# Patient Record
Sex: FEMALE | Race: WHITE | Employment: OTHER | URBAN - METROPOLITAN AREA
[De-identification: names, ages, dates, MRNs, and addresses within clinical notes are randomized per-mention and may not be internally consistent; named-entity substitution may affect disease eponyms.]

---

## 2017-01-12 ENCOUNTER — TELEPHONE (OUTPATIENT)
Dept: VASCULAR SURGERY | Age: 82
End: 2017-01-12

## 2017-01-17 ENCOUNTER — HOSPITAL ENCOUNTER (OUTPATIENT)
Dept: OTHER | Age: 82
Discharge: HOME | End: 2017-01-17
Attending: SURGERY | Admitting: SURGERY

## 2017-01-17 LAB
ANION GAP SERPL CALCULATED.3IONS-SCNC: 11 MMOL/L (ref 3–16)
APTT: 34.3 SEC (ref 25.2–36.4)
BUN BLDV-MCNC: 15 MG/DL (ref 7–20)
CALCIUM SERPL-MCNC: 9.2 MG/DL (ref 8.3–10.6)
CHLORIDE BLD-SCNC: 101 MMOL/L (ref 99–110)
CO2: 29 MMOL/L (ref 21–32)
CREAT SERPL-MCNC: 1.2 MG/DL (ref 0.6–1.2)
GFR AFRICAN AMERICAN: 52
GFR NON-AFRICAN AMERICAN: 43
GLUCOSE BLD-MCNC: 146 MG/DL (ref 70–99)
HCT VFR BLD CALC: 40.9 % (ref 36–48)
HEMOGLOBIN: 13.6 G/DL (ref 12–16)
INR BLD: 0.95 (ref 0.85–1.16)
MCH RBC QN AUTO: 30.5 PG (ref 26–34)
MCHC RBC AUTO-ENTMCNC: 33.3 G/DL (ref 31–36)
MCV RBC AUTO: 91.7 FL (ref 80–100)
PDW BLD-RTO: 12.8 % (ref 12.4–15.4)
PLATELET # BLD: 315 K/UL (ref 135–450)
PMV BLD AUTO: 7.8 FL (ref 5–10.5)
POTASSIUM SERPL-SCNC: 5.1 MMOL/L (ref 3.5–5.1)
PROTHROMBIN TIME: 10.8 SEC (ref 9.8–13)
RBC # BLD: 4.46 M/UL (ref 4–5.2)
SODIUM BLD-SCNC: 141 MMOL/L (ref 136–145)
WBC # BLD: 8.8 K/UL (ref 4–11)

## 2017-01-31 ENCOUNTER — HOSPITAL ENCOUNTER (OUTPATIENT)
Dept: OTHER | Age: 82
Discharge: HOME | End: 2017-01-31
Attending: INTERNAL MEDICINE | Admitting: INTERNAL MEDICINE

## 2017-01-31 DIAGNOSIS — R06.09 DYSPNEA ON EXERTION: ICD-10-CM

## 2017-02-21 ENCOUNTER — HOSPITAL ENCOUNTER (OUTPATIENT)
Dept: OTHER | Age: 82
Discharge: HOME | End: 2017-02-21
Attending: INTERNAL MEDICINE | Admitting: INTERNAL MEDICINE

## 2017-02-21 DIAGNOSIS — J18.9 PNEUMONIA OF RIGHT MIDDLE LOBE DUE TO INFECTIOUS ORGANISM: ICD-10-CM

## 2017-02-24 ENCOUNTER — OFFICE VISIT (OUTPATIENT)
Dept: CARDIOTHORACIC SURGERY | Age: 82
End: 2017-02-24

## 2017-02-24 VITALS — DIASTOLIC BLOOD PRESSURE: 70 MMHG | WEIGHT: 137 LBS | BODY MASS INDEX: 25.06 KG/M2 | SYSTOLIC BLOOD PRESSURE: 130 MMHG

## 2017-02-24 DIAGNOSIS — I70.213 ATHEROSCLEROSIS OF NATIVE ARTERY OF BOTH LOWER EXTREMITIES WITH INTERMITTENT CLAUDICATION (HCC): Primary | ICD-10-CM

## 2017-02-24 PROCEDURE — 99212 OFFICE O/P EST SF 10 MIN: CPT | Performed by: SURGERY

## 2017-02-24 PROCEDURE — 1090F PRES/ABSN URINE INCON ASSESS: CPT | Performed by: SURGERY

## 2017-02-24 PROCEDURE — 4040F PNEUMOC VAC/ADMIN/RCVD: CPT | Performed by: SURGERY

## 2017-02-24 PROCEDURE — G8400 PT W/DXA NO RESULTS DOC: HCPCS | Performed by: SURGERY

## 2017-02-24 PROCEDURE — G8598 ASA/ANTIPLAT THER USED: HCPCS | Performed by: SURGERY

## 2017-02-24 PROCEDURE — G8484 FLU IMMUNIZE NO ADMIN: HCPCS | Performed by: SURGERY

## 2017-02-24 PROCEDURE — G8427 DOCREV CUR MEDS BY ELIG CLIN: HCPCS | Performed by: SURGERY

## 2017-02-24 PROCEDURE — 1036F TOBACCO NON-USER: CPT | Performed by: SURGERY

## 2017-02-24 PROCEDURE — G8420 CALC BMI NORM PARAMETERS: HCPCS | Performed by: SURGERY

## 2017-02-24 PROCEDURE — 1123F ACP DISCUSS/DSCN MKR DOCD: CPT | Performed by: SURGERY

## 2017-02-24 RX ORDER — ATORVASTATIN CALCIUM 20 MG/1
20 TABLET, FILM COATED ORAL DAILY
Qty: 30 TABLET | Refills: 3 | Status: SHIPPED | OUTPATIENT
Start: 2017-02-24 | End: 2017-05-16 | Stop reason: SDUPTHER

## 2017-03-23 PROBLEM — G11.9 ATAXIA DUE TO CEREBELLAR DEGENERATION (HCC): Status: ACTIVE | Noted: 2017-03-23

## 2017-03-23 PROBLEM — J45.901 ASTHMATIC BRONCHITIS WITH ACUTE EXACERBATION: Status: ACTIVE | Noted: 2017-03-23

## 2017-04-12 ENCOUNTER — HOSPITAL ENCOUNTER (OUTPATIENT)
Dept: OTHER | Age: 82
Discharge: HOME | End: 2017-04-12
Attending: INTERNAL MEDICINE | Admitting: INTERNAL MEDICINE

## 2017-04-12 DIAGNOSIS — R42 DIZZINESS: ICD-10-CM

## 2017-04-12 LAB
A/G RATIO: 1.2 (ref 1.1–2.2)
ALBUMIN SERPL-MCNC: 3.6 G/DL (ref 3.4–5)
ALP BLD-CCNC: 69 U/L (ref 40–129)
ALT SERPL-CCNC: 12 U/L (ref 10–40)
ANION GAP SERPL CALCULATED.3IONS-SCNC: 15 MMOL/L (ref 3–16)
AST SERPL-CCNC: 13 U/L (ref 15–37)
BILIRUB SERPL-MCNC: 0.3 MG/DL (ref 0–1)
BUN BLDV-MCNC: 11 MG/DL (ref 7–20)
CALCIUM SERPL-MCNC: 8.8 MG/DL (ref 8.3–10.6)
CHLORIDE BLD-SCNC: 101 MMOL/L (ref 99–110)
CO2: 26 MMOL/L (ref 21–32)
CREAT SERPL-MCNC: 1 MG/DL (ref 0.6–1.2)
GFR AFRICAN AMERICAN: >60
GFR NON-AFRICAN AMERICAN: 53
GLOBULIN: 3.1 G/DL
GLUCOSE BLD-MCNC: 160 MG/DL (ref 70–99)
HCT VFR BLD CALC: 40.2 % (ref 36–48)
HEMOGLOBIN: 13.3 G/DL (ref 12–16)
MCH RBC QN AUTO: 30.2 PG (ref 26–34)
MCHC RBC AUTO-ENTMCNC: 33 G/DL (ref 31–36)
MCV RBC AUTO: 91.3 FL (ref 80–100)
PDW BLD-RTO: 14.1 % (ref 12.4–15.4)
PLATELET # BLD: 210 K/UL (ref 135–450)
PMV BLD AUTO: 7.9 FL (ref 5–10.5)
POTASSIUM SERPL-SCNC: 5.2 MMOL/L (ref 3.5–5.1)
RBC # BLD: 4.4 M/UL (ref 4–5.2)
SODIUM BLD-SCNC: 142 MMOL/L (ref 136–145)
TOTAL PROTEIN: 6.7 G/DL (ref 6.4–8.2)
WBC # BLD: 8.9 K/UL (ref 4–11)

## 2017-05-26 ENCOUNTER — OFFICE VISIT (OUTPATIENT)
Dept: VASCULAR SURGERY | Age: 82
End: 2017-05-26

## 2017-05-26 VITALS
DIASTOLIC BLOOD PRESSURE: 68 MMHG | BODY MASS INDEX: 25.21 KG/M2 | SYSTOLIC BLOOD PRESSURE: 130 MMHG | HEIGHT: 62 IN | WEIGHT: 137 LBS

## 2017-05-26 DIAGNOSIS — I70.223 ATHEROSCLEROSIS OF NATIVE ARTERIES OF EXTREMITIES WITH REST PAIN, BILATERAL LEGS (HCC): Primary | ICD-10-CM

## 2017-05-26 PROCEDURE — G8427 DOCREV CUR MEDS BY ELIG CLIN: HCPCS | Performed by: SURGERY

## 2017-05-26 PROCEDURE — G8598 ASA/ANTIPLAT THER USED: HCPCS | Performed by: SURGERY

## 2017-05-26 PROCEDURE — 4040F PNEUMOC VAC/ADMIN/RCVD: CPT | Performed by: SURGERY

## 2017-05-26 PROCEDURE — 1123F ACP DISCUSS/DSCN MKR DOCD: CPT | Performed by: SURGERY

## 2017-05-26 PROCEDURE — 99212 OFFICE O/P EST SF 10 MIN: CPT | Performed by: SURGERY

## 2017-05-26 PROCEDURE — G8420 CALC BMI NORM PARAMETERS: HCPCS | Performed by: SURGERY

## 2017-05-26 PROCEDURE — 1090F PRES/ABSN URINE INCON ASSESS: CPT | Performed by: SURGERY

## 2017-05-26 PROCEDURE — G8400 PT W/DXA NO RESULTS DOC: HCPCS | Performed by: SURGERY

## 2017-05-26 PROCEDURE — 1036F TOBACCO NON-USER: CPT | Performed by: SURGERY

## 2017-06-02 ENCOUNTER — CARE COORDINATION (OUTPATIENT)
Dept: CASE MANAGEMENT | Age: 82
End: 2017-06-02

## 2017-07-27 ENCOUNTER — OFFICE VISIT (OUTPATIENT)
Dept: ORTHOPEDIC SURGERY | Age: 82
End: 2017-07-27

## 2017-07-27 VITALS
TEMPERATURE: 97.4 F | DIASTOLIC BLOOD PRESSURE: 75 MMHG | HEIGHT: 62 IN | WEIGHT: 131 LBS | BODY MASS INDEX: 24.11 KG/M2 | HEART RATE: 70 BPM | SYSTOLIC BLOOD PRESSURE: 143 MMHG

## 2017-07-27 DIAGNOSIS — Z96.651 HISTORY OF TOTAL RIGHT KNEE REPLACEMENT: Primary | ICD-10-CM

## 2017-07-27 PROCEDURE — 1036F TOBACCO NON-USER: CPT | Performed by: PHYSICIAN ASSISTANT

## 2017-07-27 PROCEDURE — G8427 DOCREV CUR MEDS BY ELIG CLIN: HCPCS | Performed by: PHYSICIAN ASSISTANT

## 2017-07-27 PROCEDURE — 1123F ACP DISCUSS/DSCN MKR DOCD: CPT | Performed by: PHYSICIAN ASSISTANT

## 2017-07-27 PROCEDURE — 1090F PRES/ABSN URINE INCON ASSESS: CPT | Performed by: PHYSICIAN ASSISTANT

## 2017-07-27 PROCEDURE — 99212 OFFICE O/P EST SF 10 MIN: CPT | Performed by: PHYSICIAN ASSISTANT

## 2017-07-27 PROCEDURE — G8400 PT W/DXA NO RESULTS DOC: HCPCS | Performed by: PHYSICIAN ASSISTANT

## 2017-07-27 PROCEDURE — 4040F PNEUMOC VAC/ADMIN/RCVD: CPT | Performed by: PHYSICIAN ASSISTANT

## 2017-07-27 PROCEDURE — G8420 CALC BMI NORM PARAMETERS: HCPCS | Performed by: PHYSICIAN ASSISTANT

## 2017-07-27 PROCEDURE — G8598 ASA/ANTIPLAT THER USED: HCPCS | Performed by: PHYSICIAN ASSISTANT

## 2017-08-22 ENCOUNTER — HOSPITAL ENCOUNTER (OUTPATIENT)
Dept: VASCULAR LAB | Age: 82
Discharge: HOME | End: 2017-08-22
Attending: SURGERY | Admitting: SURGERY

## 2017-08-22 ENCOUNTER — OFFICE VISIT (OUTPATIENT)
Dept: VASCULAR SURGERY | Age: 82
End: 2017-08-22

## 2017-08-22 VITALS
WEIGHT: 126.6 LBS | DIASTOLIC BLOOD PRESSURE: 70 MMHG | HEIGHT: 62 IN | BODY MASS INDEX: 23.3 KG/M2 | SYSTOLIC BLOOD PRESSURE: 132 MMHG

## 2017-08-22 DIAGNOSIS — I70.223 ATHEROSCLEROSIS OF NATIVE ARTERIES OF EXTREMITIES WITH REST PAIN, BILATERAL LEGS (HCC): ICD-10-CM

## 2017-08-22 DIAGNOSIS — I70.223 ATHEROSCLEROSIS OF NATIVE ARTERY OF BOTH LOWER EXTREMITIES WITH REST PAIN (HCC): ICD-10-CM

## 2017-08-22 DIAGNOSIS — I70.223 ATHEROSCLEROSIS OF NATIVE ARTERIES OF EXTREMITIES WITH REST PAIN, BILATERAL LEGS (HCC): Primary | ICD-10-CM

## 2017-08-22 PROCEDURE — G8428 CUR MEDS NOT DOCUMENT: HCPCS | Performed by: SURGERY

## 2017-08-22 PROCEDURE — 4040F PNEUMOC VAC/ADMIN/RCVD: CPT | Performed by: SURGERY

## 2017-08-22 PROCEDURE — 1090F PRES/ABSN URINE INCON ASSESS: CPT | Performed by: SURGERY

## 2017-08-22 PROCEDURE — G8420 CALC BMI NORM PARAMETERS: HCPCS | Performed by: SURGERY

## 2017-08-22 PROCEDURE — 1036F TOBACCO NON-USER: CPT | Performed by: SURGERY

## 2017-08-22 PROCEDURE — G8400 PT W/DXA NO RESULTS DOC: HCPCS | Performed by: SURGERY

## 2017-08-22 PROCEDURE — G8598 ASA/ANTIPLAT THER USED: HCPCS | Performed by: SURGERY

## 2017-08-22 PROCEDURE — 99213 OFFICE O/P EST LOW 20 MIN: CPT | Performed by: SURGERY

## 2017-08-22 PROCEDURE — 1123F ACP DISCUSS/DSCN MKR DOCD: CPT | Performed by: SURGERY

## 2017-09-10 RX ORDER — DIPHENOXYLATE HYDROCHLORIDE AND ATROPINE SULFATE 2.5; .025 MG/1; MG/1
1 TABLET ORAL 4 TIMES DAILY PRN
Qty: 120 TABLET | Refills: 1 | OUTPATIENT
Start: 2017-09-10 | End: 2017-09-11 | Stop reason: SDUPTHER

## 2017-10-10 ENCOUNTER — HOSPITAL ENCOUNTER (OUTPATIENT)
Dept: VASCULAR LAB | Age: 82
Discharge: HOME | End: 2017-10-10
Attending: SURGERY | Admitting: SURGERY

## 2017-10-10 ENCOUNTER — OFFICE VISIT (OUTPATIENT)
Dept: VASCULAR SURGERY | Age: 82
End: 2017-10-10

## 2017-10-10 VITALS
WEIGHT: 126 LBS | DIASTOLIC BLOOD PRESSURE: 62 MMHG | BODY MASS INDEX: 23.19 KG/M2 | HEIGHT: 62 IN | SYSTOLIC BLOOD PRESSURE: 118 MMHG

## 2017-10-10 DIAGNOSIS — I82.811: Primary | ICD-10-CM

## 2017-10-10 DIAGNOSIS — I82.811 EMBOLISM AND THROMBOSIS OF SUPERFICIAL VEIN OF RIGHT LOWER EXTREMITY: ICD-10-CM

## 2017-10-10 DIAGNOSIS — I82.811: ICD-10-CM

## 2017-10-10 PROCEDURE — G8598 ASA/ANTIPLAT THER USED: HCPCS | Performed by: SURGERY

## 2017-10-10 PROCEDURE — 1123F ACP DISCUSS/DSCN MKR DOCD: CPT | Performed by: SURGERY

## 2017-10-10 PROCEDURE — G8427 DOCREV CUR MEDS BY ELIG CLIN: HCPCS | Performed by: SURGERY

## 2017-10-10 PROCEDURE — 99212 OFFICE O/P EST SF 10 MIN: CPT | Performed by: SURGERY

## 2017-10-10 PROCEDURE — G8420 CALC BMI NORM PARAMETERS: HCPCS | Performed by: SURGERY

## 2017-10-10 PROCEDURE — G8400 PT W/DXA NO RESULTS DOC: HCPCS | Performed by: SURGERY

## 2017-10-10 PROCEDURE — 1036F TOBACCO NON-USER: CPT | Performed by: SURGERY

## 2017-10-10 PROCEDURE — 4040F PNEUMOC VAC/ADMIN/RCVD: CPT | Performed by: SURGERY

## 2017-10-10 PROCEDURE — G8484 FLU IMMUNIZE NO ADMIN: HCPCS | Performed by: SURGERY

## 2017-10-10 PROCEDURE — 1090F PRES/ABSN URINE INCON ASSESS: CPT | Performed by: SURGERY

## 2017-10-10 NOTE — PROGRESS NOTES
meals) 180 tablet 0    SITagliptin (JANUVIA) 100 MG tablet Take 1 tablet by mouth daily 90 tablet 0    Calcium Polycarbophil (FIBER) 625 MG TABS Take 1 tablet by mouth 2 times daily 60 tablet 5    apixaban (ELIQUIS) 5 MG TABS tablet Take 5 mg by mouth 2 times daily      atorvastatin (LIPITOR) 20 MG tablet Take 1 tablet by mouth daily 30 tablet 3    guaiFENesin (MUCINEX) 600 MG extended release tablet Take 1 tablet by mouth 2 times daily 40 tablet 1    albuterol sulfate HFA (PROAIR HFA) 108 (90 BASE) MCG/ACT inhaler Inhale 2 puffs into the lungs every 4 hours as needed for Wheezing or Shortness of Breath 1 Inhaler 3    fluticasone (FLONASE) 50 MCG/ACT nasal spray 1 spray by Nasal route daily 3 Bottle 1    Calcium Carbonate-Vitamin D (CALTRATE 600+D PO) Take 1 tablet by mouth 2 times daily.  Multiple Vitamin (MULTIVITAMIN PO) Take  by mouth daily. No current facility-administered medications for this visit. Allergies:  Cephalexin and Morphine     Review of Systems:   · Constitutional: there has been no unanticipated weight loss. There's been no change in energy level, sleep pattern, or activity level. · Eyes: No visual changes or diplopia. No scleral icterus. · ENT: No Headaches, hearing loss or vertigo. No mouth sores or sore throat. · Cardiovascular: Reviewed in HPI  · Respiratory: No cough or wheezing, no sputum production. No hematemesis. · Gastrointestinal: No abdominal pain, appetite loss, blood in stools. No change in bowel or bladder habits. · Genitourinary: No dysuria, trouble voiding, or hematuria. · Musculoskeletal:  No gait disturbance, weakness or joint complaints. · Integumentary: No rash or pruritis. · Neurological: No headache, diplopia, change in muscle strength, numbness or tingling. No change in gait, balance, coordination, mood, affect, memory, mentation, behavior. · Psychiatric: No anxiety, no depression.   · Endocrine: No malaise, fatigue or temperature

## 2017-10-11 ENCOUNTER — TELEPHONE (OUTPATIENT)
Dept: VASCULAR SURGERY | Age: 82
End: 2017-10-11

## 2017-11-22 PROBLEM — I48.0 PAROXYSMAL ATRIAL FIBRILLATION (HCC): Status: ACTIVE | Noted: 2017-11-22

## 2018-01-19 PROBLEM — J45.901 ASTHMATIC BRONCHITIS WITH ACUTE EXACERBATION: Status: RESOLVED | Noted: 2017-03-23 | Resolved: 2018-01-19

## 2018-01-19 PROBLEM — E11.21 CONTROLLED TYPE 2 DIABETES MELLITUS WITH DIABETIC NEPHROPATHY, WITHOUT LONG-TERM CURRENT USE OF INSULIN (HCC): Status: ACTIVE | Noted: 2017-05-16

## 2018-02-16 PROBLEM — G11.9 ATAXIA DUE TO CEREBELLAR DEGENERATION (HCC): Status: RESOLVED | Noted: 2017-03-23 | Resolved: 2018-02-16

## 2018-03-09 ENCOUNTER — OFFICE VISIT (OUTPATIENT)
Dept: VASCULAR SURGERY | Age: 83
End: 2018-03-09

## 2018-03-09 VITALS
SYSTOLIC BLOOD PRESSURE: 122 MMHG | BODY MASS INDEX: 21.71 KG/M2 | DIASTOLIC BLOOD PRESSURE: 66 MMHG | HEIGHT: 62 IN | WEIGHT: 118 LBS

## 2018-03-09 DIAGNOSIS — I70.213 ATHEROSCLEROSIS OF NATIVE ARTERIES OF EXTREMITIES WITH INTERMITTENT CLAUDICATION, BILATERAL LEGS (HCC): Primary | ICD-10-CM

## 2018-03-09 PROCEDURE — 99212 OFFICE O/P EST SF 10 MIN: CPT | Performed by: SURGERY

## 2018-03-09 PROCEDURE — 1090F PRES/ABSN URINE INCON ASSESS: CPT | Performed by: SURGERY

## 2018-03-09 PROCEDURE — 4040F PNEUMOC VAC/ADMIN/RCVD: CPT | Performed by: SURGERY

## 2018-03-09 PROCEDURE — G8482 FLU IMMUNIZE ORDER/ADMIN: HCPCS | Performed by: SURGERY

## 2018-03-09 PROCEDURE — G8400 PT W/DXA NO RESULTS DOC: HCPCS | Performed by: SURGERY

## 2018-03-09 PROCEDURE — 1036F TOBACCO NON-USER: CPT | Performed by: SURGERY

## 2018-03-09 PROCEDURE — G8420 CALC BMI NORM PARAMETERS: HCPCS | Performed by: SURGERY

## 2018-03-09 PROCEDURE — G8598 ASA/ANTIPLAT THER USED: HCPCS | Performed by: SURGERY

## 2018-03-09 PROCEDURE — G8427 DOCREV CUR MEDS BY ELIG CLIN: HCPCS | Performed by: SURGERY

## 2018-03-09 PROCEDURE — 1123F ACP DISCUSS/DSCN MKR DOCD: CPT | Performed by: SURGERY

## 2018-03-09 NOTE — PROGRESS NOTES
No tremor. · Hematologic/Lymphatic: No abnormal bruising or bleeding, blood clots or swollen lymph nodes. · Allergic/Immunologic: No nasal congestion or hives. Physical Examination:    Vitals:    03/09/18 1059   BP: 122/66          General appearance: alert, appears stated age, cooperative and no distress  Head: Normocephalic, without obvious abnormality, atraumatic  Eyes: conjunctivae/corneas clear. PERRL, EOM's intact. Fundi benign  Neck: no adenopathy, no carotid bruit, no JVD, supple, symmetrical, trachea midline and thyroid: not enlarged, symmetric, no tenderness/mass/nodules  Lungs: clear to auscultation bilaterally  Heart: regular rate and rhythm  Abdomen: soft, non-tender. Bowel sounds normal. No masses,  no organomegaly  Extremities: extremities normal, atraumatic, no cyanosis or edema    Pulses:   L brachial 2 R brachial 2   L radial 2 R radial 2   L femoral 2 R femoral 2   L popliteal 2 R popliteal 2   L posterior tibial 2 R posterior tibial 2   L dorsalis pedis + R dorsalis pedis +   Doppler Signals:  +    Neurologic: Grossly normal    Assessment:     Patient Active Problem List   Diagnosis    Hypothyroidism    Essential hypertension    Osteopenia    Cervical radiculopathy    Thoracic back pain    Gastroesophageal reflux disease without esophagitis    Acquired hypothyroidism    PVD (peripheral vascular disease) ([address]    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 Atherosclerosis of native arteries of extremities with intermittent claudication, bilateral legs [facility])  80-year-old female with known history of peripheral vascular disease and discomfort in her legs with rest and with activity.   Her previous lower extremity noninvasive studies suggested mild

## 2018-03-13 ENCOUNTER — HOSPITAL ENCOUNTER (OUTPATIENT)
Dept: VASCULAR LAB | Age: 83
Discharge: HOME | End: 2018-03-13
Attending: SURGERY | Admitting: SURGERY

## 2018-03-13 DIAGNOSIS — I70.213 ATHEROSCLEROSIS OF NATIVE ARTERIES OF EXTREMITIES WITH INTERMITTENT CLAUDICATION, BILATERAL LEGS (HCC): ICD-10-CM

## 2018-03-13 DIAGNOSIS — I70.213 ATHEROSCLEROSIS OF NATIVE ARTERY OF BOTH LOWER EXTREMITIES WITH INTERMITTENT CLAUDICATION (HCC): ICD-10-CM

## 2018-03-19 ENCOUNTER — TELEPHONE (OUTPATIENT)
Dept: VASCULAR SURGERY | Age: 83
End: 2018-03-19

## 2018-03-19 NOTE — TELEPHONE ENCOUNTER
Spoke with patient regarding results of lower extremity arterial duplex which show mild arterial insufficiency. Recommend moving forward with angiogram, discussed with Dr. Tari Holt as well. Patient is in agreement and our office will call to schedule. All questions answered.     Electronically signed by Mir Giles CNP on 3/19/2018 at 3:57 PM

## 2018-03-27 PROBLEM — E04.1 THYROID NODULE: Status: ACTIVE | Noted: 2018-03-27

## 2018-04-02 ENCOUNTER — HOSPITAL ENCOUNTER (OUTPATIENT)
Dept: OTHER | Age: 83
Discharge: HOME | End: 2018-04-02
Attending: SURGERY | Admitting: SURGERY

## 2018-04-02 ENCOUNTER — HOSPITAL ENCOUNTER (OUTPATIENT)
Dept: ULTRASOUND IMAGING | Age: 83
Discharge: HOME | End: 2018-04-02
Attending: INTERNAL MEDICINE | Admitting: INTERNAL MEDICINE

## 2018-04-02 DIAGNOSIS — E04.1 NONTOXIC SINGLE THYROID NODULE: ICD-10-CM

## 2018-04-02 DIAGNOSIS — E04.1 THYROID NODULE: ICD-10-CM

## 2018-04-02 LAB
ANION GAP SERPL CALCULATED.3IONS-SCNC: 9 MMOL/L (ref 3–16)
APTT: 33 SEC (ref 24.1–34.9)
BUN BLDV-MCNC: 28 MG/DL (ref 7–20)
CALCIUM SERPL-MCNC: 9.6 MG/DL (ref 8.3–10.6)
CHLORIDE BLD-SCNC: 101 MMOL/L (ref 99–110)
CO2: 28 MMOL/L (ref 21–32)
CREAT SERPL-MCNC: 1.8 MG/DL (ref 0.6–1.2)
GFR AFRICAN AMERICAN: 32
GFR NON-AFRICAN AMERICAN: 27
GLUCOSE BLD-MCNC: 92 MG/DL (ref 70–99)
HCT VFR BLD CALC: 37.6 % (ref 36–48)
HEMOGLOBIN: 12.7 G/DL (ref 12–16)
INR BLD: 1 (ref 0.85–1.15)
MCH RBC QN AUTO: 30.9 PG (ref 26–34)
MCHC RBC AUTO-ENTMCNC: 33.8 G/DL (ref 31–36)
MCV RBC AUTO: 91.6 FL (ref 80–100)
PDW BLD-RTO: 13.7 % (ref 12.4–15.4)
PLATELET # BLD: 280 K/UL (ref 135–450)
PMV BLD AUTO: 8 FL (ref 5–10.5)
POTASSIUM SERPL-SCNC: 4.7 MMOL/L (ref 3.5–5.1)
PROTHROMBIN TIME: 11.3 SEC (ref 9.6–13)
RBC # BLD: 4.11 M/UL (ref 4–5.2)
SODIUM BLD-SCNC: 138 MMOL/L (ref 136–145)
WBC # BLD: 7 K/UL (ref 4–11)

## 2018-04-09 ENCOUNTER — TELEPHONE (OUTPATIENT)
Dept: VASCULAR SURGERY | Age: 83
End: 2018-04-09

## 2018-04-13 PROBLEM — N17.9 ACUTE KIDNEY INJURY (HCC): Status: ACTIVE | Noted: 2018-04-13

## 2018-05-29 ENCOUNTER — HOSPITAL ENCOUNTER (OUTPATIENT)
Dept: NUCLEAR MEDICINE | Age: 83
Discharge: HOME | End: 2018-05-29
Admitting: INTERNAL MEDICINE

## 2018-05-29 DIAGNOSIS — E04.1 NONTOXIC SINGLE THYROID NODULE: ICD-10-CM

## 2018-05-29 DIAGNOSIS — E04.1 THYROID NODULE, COLD: ICD-10-CM

## 2018-05-30 ENCOUNTER — HOSPITAL ENCOUNTER (OUTPATIENT)
Dept: NUCLEAR MEDICINE | Age: 83
Discharge: HOME | End: 2018-05-31
Admitting: INTERNAL MEDICINE

## 2018-05-30 DIAGNOSIS — E04.1 NONTOXIC SINGLE THYROID NODULE: ICD-10-CM

## 2018-06-15 ENCOUNTER — OFFICE VISIT (OUTPATIENT)
Dept: VASCULAR SURGERY | Age: 83
End: 2018-06-15

## 2018-06-15 VITALS
HEIGHT: 62 IN | BODY MASS INDEX: 22.08 KG/M2 | WEIGHT: 120 LBS | SYSTOLIC BLOOD PRESSURE: 118 MMHG | DIASTOLIC BLOOD PRESSURE: 64 MMHG

## 2018-06-15 DIAGNOSIS — I70.213 ATHEROSCLEROSIS OF NATIVE ARTERIES OF EXTREMITIES WITH INTERMITTENT CLAUDICATION, BILATERAL LEGS (HCC): Primary | ICD-10-CM

## 2018-06-15 PROCEDURE — G8420 CALC BMI NORM PARAMETERS: HCPCS | Performed by: SURGERY

## 2018-06-15 PROCEDURE — 1036F TOBACCO NON-USER: CPT | Performed by: SURGERY

## 2018-06-15 PROCEDURE — 1123F ACP DISCUSS/DSCN MKR DOCD: CPT | Performed by: SURGERY

## 2018-06-15 PROCEDURE — 99212 OFFICE O/P EST SF 10 MIN: CPT | Performed by: SURGERY

## 2018-06-15 PROCEDURE — G8598 ASA/ANTIPLAT THER USED: HCPCS | Performed by: SURGERY

## 2018-06-15 PROCEDURE — 4040F PNEUMOC VAC/ADMIN/RCVD: CPT | Performed by: SURGERY

## 2018-06-15 PROCEDURE — G8400 PT W/DXA NO RESULTS DOC: HCPCS | Performed by: SURGERY

## 2018-06-15 PROCEDURE — G8427 DOCREV CUR MEDS BY ELIG CLIN: HCPCS | Performed by: SURGERY

## 2018-06-15 PROCEDURE — 1090F PRES/ABSN URINE INCON ASSESS: CPT | Performed by: SURGERY

## 2018-06-19 PROBLEM — M47.816 SPONDYLOSIS OF LUMBAR SPINE: Status: ACTIVE | Noted: 2018-06-19

## 2018-07-31 ENCOUNTER — OFFICE VISIT (OUTPATIENT)
Dept: ORTHOPEDIC SURGERY | Age: 83
End: 2018-07-31

## 2018-07-31 VITALS
DIASTOLIC BLOOD PRESSURE: 72 MMHG | SYSTOLIC BLOOD PRESSURE: 157 MMHG | WEIGHT: 121 LBS | TEMPERATURE: 97 F | HEART RATE: 62 BPM | HEIGHT: 62 IN | BODY MASS INDEX: 22.26 KG/M2

## 2018-07-31 DIAGNOSIS — Z96.651 HISTORY OF TOTAL RIGHT KNEE REPLACEMENT: Primary | ICD-10-CM

## 2018-07-31 PROCEDURE — 1036F TOBACCO NON-USER: CPT | Performed by: PHYSICIAN ASSISTANT

## 2018-07-31 PROCEDURE — 1123F ACP DISCUSS/DSCN MKR DOCD: CPT | Performed by: PHYSICIAN ASSISTANT

## 2018-07-31 PROCEDURE — G8598 ASA/ANTIPLAT THER USED: HCPCS | Performed by: PHYSICIAN ASSISTANT

## 2018-07-31 PROCEDURE — 1090F PRES/ABSN URINE INCON ASSESS: CPT | Performed by: PHYSICIAN ASSISTANT

## 2018-07-31 PROCEDURE — G8427 DOCREV CUR MEDS BY ELIG CLIN: HCPCS | Performed by: PHYSICIAN ASSISTANT

## 2018-07-31 PROCEDURE — 99212 OFFICE O/P EST SF 10 MIN: CPT | Performed by: PHYSICIAN ASSISTANT

## 2018-07-31 PROCEDURE — 1101F PT FALLS ASSESS-DOCD LE1/YR: CPT | Performed by: PHYSICIAN ASSISTANT

## 2018-07-31 PROCEDURE — 4040F PNEUMOC VAC/ADMIN/RCVD: CPT | Performed by: PHYSICIAN ASSISTANT

## 2018-07-31 PROCEDURE — G8400 PT W/DXA NO RESULTS DOC: HCPCS | Performed by: PHYSICIAN ASSISTANT

## 2018-07-31 PROCEDURE — G8420 CALC BMI NORM PARAMETERS: HCPCS | Performed by: PHYSICIAN ASSISTANT

## 2018-08-01 NOTE — PROGRESS NOTES
Subjective:      Patient ID: Marylee Sevin is a 80 y.o. female. Chief Complaint   Patient presents with    Follow-up     right TKA DOS 3/4/16        HPI: She is here for annual follow up on right knee arthroplasty. The date of procedure- 3/4/2016. No new complaints or issues. There is minimal to no discomfort with ambulation. There is minimal to no discomfort at rest.   Steps can be performed in reciprocal fashion. Review of Systems:   A full list of the ROS have been reviewed. These are recorded and signed in the chart. Past Medical History:   Diagnosis Date    [facility]     DDD (degenerative disc disease)     Hypertension     Macular degeneration     bilateral    PVD [facility]peripheral vascular disease) [facility]     Reflux     Thyroid disease        Family History   Problem Relation Age of Onset    Diabetes Mother     Heart Disease Father        Past Surgical History:   Procedure Laterality Date    CATARACT REMOVAL WITH IMPLANT Right     CHOLECYSTECTOMY      COLON SURGERY      small bowel polyps    EYE SURGERY      for macular deg    HYSTERECTOMY      JOINT REPLACEMENT Right 03-    knee    KNEE ARTHROSCOPY  8-20-15    right medial menisectomy loose body.  KNEE ARTHROSCOPY Right 8/20/2015    RIGHT KNEE ARTHROSCOPIC PARTIAL MENISECTOMY AND REMOVAL OF LOOSE BODY    PRE-MALIGNANT / BENIGN SKIN LESION EXCISION  3/4/11    inside right cheek    SINUS SURGERY      TONSILLECTOMY         Social History     Occupational History    Not on file.      Social History Main Topics    Smoking status: Former Smoker    Smokeless tobacco: Never Used      Comment: quit [id number]    Alcohol use No    Drug use: No    Sexual activity: Not on file       Current Outpatient Prescriptions   Medication Sig Dispense Refill    apixaban (ELIQUIS) 2.5 MG TABS tablet Take 1 tablet by mouth 2 times daily 180 tablet 1    pantoprazole (PROTONIX) 40 MG tablet Take 1 tablet by mouth daily 90 tablet 0    levothyroxine (SYNTHROID) 50 MCG tablet Take 1 tablet by mouth daily 90 tablet 0    clopidogrel (PLAVIX) 75 MG tablet Take 1 tablet by mouth daily 90 tablet 0    metoprolol tartrate (LOPRESSOR) 50 MG tablet Take 1 tablet by mouth daily 90 tablet 0    metFORMIN (GLUCOPHAGE) 1000 MG tablet Take 1 tablet by mouth 2 times daily (with meals) 180 tablet 0    tiZANidine (ZANAFLEX) 4 MG tablet Take 1 tablet by mouth nightly 90 tablet 0    apixaban (ELIQUIS) 2.5 MG TABS tablet Take by mouth 2 times daily      Calcium Polycarbophil (FIBER) 625 MG TABS Take 1 tablet by mouth 2 times daily 60 tablet 5    albuterol sulfate HFA (PROAIR HFA) 108 (90 BASE) MCG/ACT inhaler Inhale 2 puffs into the lungs every 4 hours as needed for Wheezing or Shortness of Breath 1 Inhaler 3    Calcium Carbonate-Vitamin D (CALTRATE 600+D PO) Take 1 tablet by mouth 2 times daily.  Multiple Vitamin (MULTIVITAMIN PO) Take  by mouth daily. No current facility-administered medications for this visit. Objective:     She is alert, oriented x 3, pleasant, well nourished, developed and in no acute distress. BP (!) 157/72   Pulse 62   Temp 97 °F (36.1 °C) (Temporal)   Ht 5' 2\" (1.575 m)   Wt 121 lb (54.9 kg)   BMI 22.13 kg/m²      KNEE EXAM:  Examination of the right knee shows: There is  no obvious deformity. There is not erythema. There is minimal to no soft tissue swelling. There is no significant joint effusion. AROM-  Extension 0                     Flexion  125  There is no pain associated with ROM testing. Medial joint line is not tender to palpation. Lateral joint line is not tender to palpation. There is not crepitus along the joint line with ROM testing. There is no significant instability with varus or valgus stress testing. Anterior Drawer test is negative for instability. Extensor Mechanism is  intact.      X Rays: performed in the office today:   AP,

## 2018-10-11 ENCOUNTER — OFFICE VISIT (OUTPATIENT)
Dept: ENT CLINIC | Age: 83
End: 2018-10-11
Payer: MEDICARE

## 2018-10-11 VITALS — OXYGEN SATURATION: 97 % | DIASTOLIC BLOOD PRESSURE: 56 MMHG | HEART RATE: 62 BPM | SYSTOLIC BLOOD PRESSURE: 124 MMHG

## 2018-10-11 DIAGNOSIS — K13.79 ORAL MASS: Primary | ICD-10-CM

## 2018-10-11 PROCEDURE — 1101F PT FALLS ASSESS-DOCD LE1/YR: CPT | Performed by: OTOLARYNGOLOGY

## 2018-10-11 PROCEDURE — 1123F ACP DISCUSS/DSCN MKR DOCD: CPT | Performed by: OTOLARYNGOLOGY

## 2018-10-11 PROCEDURE — G8400 PT W/DXA NO RESULTS DOC: HCPCS | Performed by: OTOLARYNGOLOGY

## 2018-10-11 PROCEDURE — 4040F PNEUMOC VAC/ADMIN/RCVD: CPT | Performed by: OTOLARYNGOLOGY

## 2018-10-11 PROCEDURE — G8427 DOCREV CUR MEDS BY ELIG CLIN: HCPCS | Performed by: OTOLARYNGOLOGY

## 2018-10-11 PROCEDURE — G8482 FLU IMMUNIZE ORDER/ADMIN: HCPCS | Performed by: OTOLARYNGOLOGY

## 2018-10-11 PROCEDURE — G8420 CALC BMI NORM PARAMETERS: HCPCS | Performed by: OTOLARYNGOLOGY

## 2018-10-11 PROCEDURE — 1090F PRES/ABSN URINE INCON ASSESS: CPT | Performed by: OTOLARYNGOLOGY

## 2018-10-11 PROCEDURE — G8598 ASA/ANTIPLAT THER USED: HCPCS | Performed by: OTOLARYNGOLOGY

## 2018-10-11 PROCEDURE — 1036F TOBACCO NON-USER: CPT | Performed by: OTOLARYNGOLOGY

## 2018-10-11 PROCEDURE — 99203 OFFICE O/P NEW LOW 30 MIN: CPT | Performed by: OTOLARYNGOLOGY

## 2018-10-11 ASSESSMENT — ENCOUNTER SYMPTOMS
TROUBLE SWALLOWING: 0
SINUS PAIN: 0
RESPIRATORY NEGATIVE: 1
SORE THROAT: 0
RHINORRHEA: 0
FACIAL SWELLING: 0
EYES NEGATIVE: 1
SINUS PRESSURE: 0
ALLERGIC/IMMUNOLOGIC NEGATIVE: 1

## 2018-10-11 NOTE — PROGRESS NOTES
Lymphadenopathy:     She has no cervical adenopathy. Neurological: She is alert and oriented to person, place, and time. Skin: Skin is warm and dry. Psychiatric: She has a normal mood and affect. Her behavior is normal. Judgment and thought content normal.       Assessment:      Right buccal submucosal mass      Plan:      Excision of the mass will be done under local anesthesia on an outpatient basis.         Peter rBay MD

## 2018-10-26 NOTE — PROGRESS NOTES
piercings on day of surgery. All body piercing jewelry must be removed. 11. If you have ___dentures, they will be removed before going to the OR; we will provide you a container. If you wear ___contact lenses or ___glasses, they will be removed; please bring a case for them. 12. Please see your family doctor/pediatrician for a history & physical and/or concerning medications. Bring any test results/reports from your physician's office. PCP__________________Phone___________H&P Appt. Date________             13 If you  have a Living Will and Durable Power of  for Healthcare, please bring in a copy. 15. Notify your Surgeon if you develop any illness between now and surgery  time, cough, cold, fever, sore throat, nausea, vomiting, etc.  Please notify your surgeon if you experience dizziness, shortness of breath or blurred vision between now & the time of your surgery             15. DO NOT shave your operative site 96 hours prior to surgery. For face & neck surgery, men may use an electric razor 48 hours prior to surgery. 16. Shower the night before surgery with ___Antibacterial soap ___Hibiclens             17. To provide excellent care visitors will be limited to one in the room at any given time. 18.  Please bring picture ID and insurance card. 19.  Visit our web site for additional information:  [email or web address]              20.During flu season no children under the age of 15 are permitted in the hospital for the safety of all patients. 21. If you take a long acting insulin in the evening only  take half of your usual  dose the night  before your procedure              22. If you use a c-pap please bring DOS if staying overnight,             23.For your convenience [facility] has a pharmacy on site to fill your prescriptions.              24. If you use oxygen and have a portable tank please bring it

## 2018-10-31 ENCOUNTER — HOSPITAL ENCOUNTER (OUTPATIENT)
Age: 83
Setting detail: OUTPATIENT SURGERY
Discharge: HOME | End: 2018-10-31
Attending: OTOLARYNGOLOGY | Admitting: OTOLARYNGOLOGY
Payer: MEDICARE

## 2018-10-31 VITALS
TEMPERATURE: 98.8 F | BODY MASS INDEX: 21.7 KG/M2 | SYSTOLIC BLOOD PRESSURE: 152 MMHG | HEART RATE: 69 BPM | DIASTOLIC BLOOD PRESSURE: 59 MMHG | OXYGEN SATURATION: 100 % | HEIGHT: 62 IN | WEIGHT: 117.9 LBS | RESPIRATION RATE: 14 BRPM

## 2018-10-31 DIAGNOSIS — K13.79 ORAL MASS: Primary | ICD-10-CM

## 2018-10-31 LAB
GLUCOSE BLD-MCNC: 100 MG/DL (ref 70–99)
PERFORMED ON: ABNORMAL

## 2018-10-31 PROCEDURE — 88305 TISSUE EXAM BY PATHOLOGIST: CPT

## 2018-10-31 PROCEDURE — 7100000010 HC PHASE II RECOVERY - FIRST 15 MIN: Performed by: OTOLARYNGOLOGY

## 2018-10-31 PROCEDURE — 2709999900 HC NON-CHARGEABLE SUPPLY: Performed by: OTOLARYNGOLOGY

## 2018-10-31 PROCEDURE — 3600000015 HC SURGERY LEVEL 5 ADDTL 15MIN: Performed by: OTOLARYNGOLOGY

## 2018-10-31 PROCEDURE — 7100000011 HC PHASE II RECOVERY - ADDTL 15 MIN: Performed by: OTOLARYNGOLOGY

## 2018-10-31 PROCEDURE — 2500000003 HC RX 250 WO HCPCS: Performed by: OTOLARYNGOLOGY

## 2018-10-31 PROCEDURE — 3600000005 HC SURGERY LEVEL 5 BASE: Performed by: OTOLARYNGOLOGY

## 2018-10-31 PROCEDURE — 40812 EXCISE/REPAIR MOUTH LESION: CPT | Performed by: OTOLARYNGOLOGY

## 2018-10-31 RX ORDER — ACETAMINOPHEN AND CODEINE PHOSPHATE 300; 30 MG/1; MG/1
1 TABLET ORAL EVERY 4 HOURS PRN
Qty: 20 TABLET | Refills: 0 | Status: SHIPPED | OUTPATIENT
Start: 2018-10-31 | End: 2018-11-07

## 2018-10-31 RX ORDER — SODIUM CHLORIDE 0.9 % (FLUSH) 0.9 %
10 SYRINGE (ML) INJECTION PRN
Status: CANCELLED | OUTPATIENT
Start: 2018-10-31

## 2018-10-31 RX ORDER — AZITHROMYCIN 250 MG/1
TABLET, FILM COATED ORAL
Qty: 1 PACKET | Refills: 0 | Status: SHIPPED | OUTPATIENT
Start: 2018-10-31 | End: 2018-11-04

## 2018-10-31 RX ORDER — ACETAMINOPHEN 325 MG/1
650 TABLET ORAL EVERY 4 HOURS PRN
Status: CANCELLED | OUTPATIENT
Start: 2018-10-31

## 2018-10-31 RX ORDER — SODIUM CHLORIDE 0.9 % (FLUSH) 0.9 %
10 SYRINGE (ML) INJECTION EVERY 12 HOURS SCHEDULED
Status: CANCELLED | OUTPATIENT
Start: 2018-10-31

## 2018-10-31 RX ORDER — LIDOCAINE HYDROCHLORIDE AND EPINEPHRINE 10; 10 MG/ML; UG/ML
INJECTION, SOLUTION INFILTRATION; PERINEURAL
Status: COMPLETED | OUTPATIENT
Start: 2018-10-31 | End: 2018-10-31

## 2018-10-31 RX ORDER — ONDANSETRON 2 MG/ML
4 INJECTION INTRAMUSCULAR; INTRAVENOUS EVERY 6 HOURS PRN
Status: CANCELLED | OUTPATIENT
Start: 2018-10-31

## 2018-10-31 ASSESSMENT — PAIN SCALES - GENERAL: PAINLEVEL_OUTOF10: 0

## 2018-10-31 ASSESSMENT — PAIN - FUNCTIONAL ASSESSMENT: PAIN_FUNCTIONAL_ASSESSMENT: 0-10

## 2018-11-07 ENCOUNTER — OFFICE VISIT (OUTPATIENT)
Dept: ENT CLINIC | Age: 83
End: 2018-11-07

## 2018-11-07 VITALS — HEART RATE: 64 BPM | DIASTOLIC BLOOD PRESSURE: 70 MMHG | SYSTOLIC BLOOD PRESSURE: 144 MMHG | OXYGEN SATURATION: 97 %

## 2018-11-07 DIAGNOSIS — D10.39: Primary | ICD-10-CM

## 2018-11-07 PROCEDURE — 99024 POSTOP FOLLOW-UP VISIT: CPT | Performed by: OTOLARYNGOLOGY

## 2018-12-07 ENCOUNTER — OFFICE VISIT (OUTPATIENT)
Dept: ENT CLINIC | Age: 83
End: 2018-12-07

## 2018-12-07 VITALS
SYSTOLIC BLOOD PRESSURE: 122 MMHG | DIASTOLIC BLOOD PRESSURE: 62 MMHG | HEART RATE: 88 BPM | WEIGHT: 117 LBS | BODY MASS INDEX: 21.4 KG/M2

## 2018-12-07 DIAGNOSIS — D10.39: Primary | ICD-10-CM

## 2018-12-07 PROCEDURE — 99024 POSTOP FOLLOW-UP VISIT: CPT | Performed by: OTOLARYNGOLOGY

## 2018-12-18 ENCOUNTER — OFFICE VISIT (OUTPATIENT)
Dept: VASCULAR SURGERY | Age: 83
End: 2018-12-18
Payer: MEDICARE

## 2018-12-18 VITALS
BODY MASS INDEX: 21.9 KG/M2 | WEIGHT: 119 LBS | DIASTOLIC BLOOD PRESSURE: 72 MMHG | SYSTOLIC BLOOD PRESSURE: 132 MMHG | HEIGHT: 62 IN

## 2018-12-18 DIAGNOSIS — I70.213 ATHEROSCLEROSIS OF NATIVE ARTERIES OF EXTREMITIES WITH INTERMITTENT CLAUDICATION, BILATERAL LEGS (HCC): Primary | ICD-10-CM

## 2018-12-18 PROCEDURE — G8427 DOCREV CUR MEDS BY ELIG CLIN: HCPCS | Performed by: SURGERY

## 2018-12-18 PROCEDURE — 1036F TOBACCO NON-USER: CPT | Performed by: SURGERY

## 2018-12-18 PROCEDURE — 1123F ACP DISCUSS/DSCN MKR DOCD: CPT | Performed by: SURGERY

## 2018-12-18 PROCEDURE — 1090F PRES/ABSN URINE INCON ASSESS: CPT | Performed by: SURGERY

## 2018-12-18 PROCEDURE — 4040F PNEUMOC VAC/ADMIN/RCVD: CPT | Performed by: SURGERY

## 2018-12-18 PROCEDURE — 1101F PT FALLS ASSESS-DOCD LE1/YR: CPT | Performed by: SURGERY

## 2018-12-18 PROCEDURE — G8420 CALC BMI NORM PARAMETERS: HCPCS | Performed by: SURGERY

## 2018-12-18 PROCEDURE — G8482 FLU IMMUNIZE ORDER/ADMIN: HCPCS | Performed by: SURGERY

## 2018-12-18 PROCEDURE — G8598 ASA/ANTIPLAT THER USED: HCPCS | Performed by: SURGERY

## 2018-12-18 PROCEDURE — G8400 PT W/DXA NO RESULTS DOC: HCPCS | Performed by: SURGERY

## 2018-12-18 PROCEDURE — 99212 OFFICE O/P EST SF 10 MIN: CPT | Performed by: SURGERY

## 2019-02-15 ENCOUNTER — OFFICE VISIT (OUTPATIENT)
Dept: ENT CLINIC | Age: 84
End: 2019-02-15

## 2019-02-15 VITALS — HEART RATE: 71 BPM | SYSTOLIC BLOOD PRESSURE: 124 MMHG | OXYGEN SATURATION: 98 % | DIASTOLIC BLOOD PRESSURE: 60 MMHG

## 2019-02-15 DIAGNOSIS — D10.39: Primary | ICD-10-CM

## 2019-02-15 PROCEDURE — 99024 POSTOP FOLLOW-UP VISIT: CPT | Performed by: OTOLARYNGOLOGY

## 2019-03-29 PROBLEM — N18.30 CHRONIC KIDNEY DISEASE, STAGE III (MODERATE) (HCC): Status: ACTIVE | Noted: 2019-03-29

## 2019-04-25 PROBLEM — K13.79 ORAL MASS: Status: RESOLVED | Noted: 2018-10-11 | Resolved: 2019-04-25

## 2019-04-25 PROBLEM — N17.9 ACUTE KIDNEY INJURY (HCC): Status: RESOLVED | Noted: 2018-04-13 | Resolved: 2019-04-25

## 2019-05-20 ENCOUNTER — OFFICE VISIT (OUTPATIENT)
Dept: ENT CLINIC | Age: 84
End: 2019-05-20
Payer: MEDICARE

## 2019-05-20 VITALS — HEART RATE: 80 BPM | OXYGEN SATURATION: 97 % | DIASTOLIC BLOOD PRESSURE: 70 MMHG | SYSTOLIC BLOOD PRESSURE: 130 MMHG

## 2019-05-20 DIAGNOSIS — K13.79 ORAL MASS: Primary | ICD-10-CM

## 2019-05-20 DIAGNOSIS — H61.23 BILATERAL IMPACTED CERUMEN: ICD-10-CM

## 2019-05-20 PROCEDURE — G8598 ASA/ANTIPLAT THER USED: HCPCS | Performed by: OTOLARYNGOLOGY

## 2019-05-20 PROCEDURE — 1090F PRES/ABSN URINE INCON ASSESS: CPT | Performed by: OTOLARYNGOLOGY

## 2019-05-20 PROCEDURE — 1036F TOBACCO NON-USER: CPT | Performed by: OTOLARYNGOLOGY

## 2019-05-20 PROCEDURE — 4040F PNEUMOC VAC/ADMIN/RCVD: CPT | Performed by: OTOLARYNGOLOGY

## 2019-05-20 PROCEDURE — G8427 DOCREV CUR MEDS BY ELIG CLIN: HCPCS | Performed by: OTOLARYNGOLOGY

## 2019-05-20 PROCEDURE — 1123F ACP DISCUSS/DSCN MKR DOCD: CPT | Performed by: OTOLARYNGOLOGY

## 2019-05-20 PROCEDURE — G8420 CALC BMI NORM PARAMETERS: HCPCS | Performed by: OTOLARYNGOLOGY

## 2019-05-20 PROCEDURE — 99213 OFFICE O/P EST LOW 20 MIN: CPT | Performed by: OTOLARYNGOLOGY

## 2019-05-20 PROCEDURE — 69210 REMOVE IMPACTED EAR WAX UNI: CPT | Performed by: OTOLARYNGOLOGY

## 2019-05-20 NOTE — PROGRESS NOTES
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 She has noted some stuffiness in both of her ears. There's been no fever. She is eating well. Currently, she appears in no acute distress. Her examination does reveal some cerumen present on both sides removed with curettes. The tympanic membranes appear normal and ear canals are clear. 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 This should be excised via intraoral approach but I would like her to be asleep for it. Remainder of the oral examination is unremarkable. The nasal mucosa is normal.  Externally there is no obvious parotid swelling and no evidence of adenopathy in the neck nor other mass lesion. The thyroid gland appears normal.  We'll schedule the procedure on an outpatient basis under general anesthesia. Patient has been apprised as to the suggestion and how it would take place.

## 2019-06-10 NOTE — PROGRESS NOTES
Name_______________________________________Printed:____________________  Date and time of surgery_6/17/19______0730_________________Arrival Time:___0600  masc_____________   1. Do not eat or drink anything after 12 midnight (or____hours) prior to surgery. This includes no water, chewing gum or mints. Endoscopy patients follow your doctors bowel prep instructions,which may include taking part of prep after midnight. 2. Take the following pills with a small sip of water on the morning of surgery_metoprolol, levothyroxine, pantoprazole__________________________________________________                  Do not take blood pressure medications ending in pril or sartan the silva prior to surgery or the morning of surgery_   3. Aspirin, Ibuprofen, Advil, Naproxen, Vitamin E and other Anti-inflammatory products should be stopped for 5 days before surgery or as directed by your physician. 4. Check with your Doctor regarding stopping Plavix, Coumadin,Eliquis, Lovenox,Effient,Pradaxa,Xarelto, Fragmin or other blood thinners and follow their instructions. 5. Do not smoke, and do not drink any alcoholic beverages 24 hours prior to surgery. This includes NA Beer. Refrain from the usage of any recreational drugs. 6. You may brush your teeth and gargle the morning of surgery. DO NOT SWALLOW WATER   7. You MUST make arrangements for a responsible adult to stay on site while you are here and take you home after your surgery. You will not be allowed to leave alone or drive yourself home. It is strongly suggested someone stay with you the first 24 hrs. Your surgery will be cancelled if you do not have a ride home. 8. A parent/legal guardian must accompany a child scheduled for surgery and plan to stay at the hospital until the child is discharged. Please do not bring other children with you.    9. Please wear simple, loose fitting clothing to the hospital.  Do not bring valuables (money, credit cards, checkbooks, etc.) Do not wear any makeup (including no eye makeup) or nail polish on your fingers or toes. 10. DO NOT wear any jewelry or piercings on day of surgery. All body piercing jewelry must be removed. 11. If you have _x__dentures, they will be removed before going to the OR; we will provide you a container. If you wear ___contact lenses or _x__glasses, they will be removed; please bring a case for them. 12. Please see your family doctor/pediatrician for a history & physical and/or concerning medications. Bring any test results/reports from your physician's office. PCP__________________Phone___________H&P Appt. Date________             13 If you  have a Living Will and Durable Power of  for Healthcare, please bring in a copy. 15. Notify your Surgeon if you develop any illness between now and surgery  time, cough, cold, fever, sore throat, nausea, vomiting, etc.  Please notify your surgeon if you experience dizziness, shortness of breath or blurred vision between now & the time of your surgery             15. DO NOT shave your operative site 96 hours prior to surgery. For face & neck surgery, men may use an electric razor 48 hours prior to surgery. 16. Shower the night before surgery with _x__Antibacterial soap ___Hibiclens             17. To provide excellent care visitors will be limited to one in the room at any given time. 18.  Please bring picture ID and insurance card. 19.  Visit our web site for additional information:  [email or web address]              20.During flu season no children under the age of 15 are permitted in the hospital for the safety of all patients.                               21. If you take a long acting insulin in the evening only  take half of your usual  dose the night  before your procedure              22. If you use a c-pap please bring DOS if staying overnight,             23.For your convenience [facility] has a pharmacy on site to fill your prescriptions. 24. If you use oxygen and have a portable tank please bring it  with you the DOS             25. Bring a complete list of all your medications with name and dose include any supplements. 26. Other__________________________________________   *Please call pre admission testing if you any further questions   Leno Rodriguez [address]    [facility] AND [facility] [place]. [facility]  [phone]   [address] [place] [address]       All above information reviewed with patient in person or by phone. Patient verbalizes understanding. All questions and concerns addressed.                                                                                                  Patient/Rep_patient___________________                                                                                                                                    PRE OP INSTRUCTIONS

## 2019-06-17 ENCOUNTER — HOSPITAL ENCOUNTER (OUTPATIENT)
Age: 84
Setting detail: OUTPATIENT SURGERY
Discharge: HOME | End: 2019-06-17
Attending: OTOLARYNGOLOGY | Admitting: OTOLARYNGOLOGY
Payer: MEDICARE

## 2019-06-17 ENCOUNTER — ANESTHESIA EVENT (OUTPATIENT)
Dept: OPERATING ROOM | Age: 84
End: 2019-06-17
Payer: MEDICARE

## 2019-06-17 ENCOUNTER — ANESTHESIA (OUTPATIENT)
Dept: OPERATING ROOM | Age: 84
End: 2019-06-17
Payer: MEDICARE

## 2019-06-17 VITALS
OXYGEN SATURATION: 100 % | SYSTOLIC BLOOD PRESSURE: 90 MMHG | HEIGHT: 62 IN | WEIGHT: 120 LBS | BODY MASS INDEX: 22.08 KG/M2 | RESPIRATION RATE: 20 BRPM | HEART RATE: 75 BPM | TEMPERATURE: 97 F | DIASTOLIC BLOOD PRESSURE: 68 MMHG

## 2019-06-17 VITALS
RESPIRATION RATE: 6 BRPM | DIASTOLIC BLOOD PRESSURE: 55 MMHG | SYSTOLIC BLOOD PRESSURE: 95 MMHG | OXYGEN SATURATION: 99 %

## 2019-06-17 DIAGNOSIS — D10.39: Primary | ICD-10-CM

## 2019-06-17 LAB
GLUCOSE BLD-MCNC: 100 MG/DL (ref 70–99)
PERFORMED ON: ABNORMAL

## 2019-06-17 PROCEDURE — 7100000011 HC PHASE II RECOVERY - ADDTL 15 MIN: Performed by: OTOLARYNGOLOGY

## 2019-06-17 PROCEDURE — 3700000000 HC ANESTHESIA ATTENDED CARE: Performed by: OTOLARYNGOLOGY

## 2019-06-17 PROCEDURE — 6360000002 HC RX W HCPCS

## 2019-06-17 PROCEDURE — 6360000002 HC RX W HCPCS: Performed by: NURSE ANESTHETIST, CERTIFIED REGISTERED

## 2019-06-17 PROCEDURE — 40812 EXCISE/REPAIR MOUTH LESION: CPT | Performed by: OTOLARYNGOLOGY

## 2019-06-17 PROCEDURE — 88307 TISSUE EXAM BY PATHOLOGIST: CPT

## 2019-06-17 PROCEDURE — 2500000003 HC RX 250 WO HCPCS: Performed by: OTOLARYNGOLOGY

## 2019-06-17 PROCEDURE — 3600000003 HC SURGERY LEVEL 3 BASE: Performed by: OTOLARYNGOLOGY

## 2019-06-17 PROCEDURE — 2709999900 HC NON-CHARGEABLE SUPPLY: Performed by: OTOLARYNGOLOGY

## 2019-06-17 PROCEDURE — 7100000001 HC PACU RECOVERY - ADDTL 15 MIN: Performed by: OTOLARYNGOLOGY

## 2019-06-17 PROCEDURE — 7100000000 HC PACU RECOVERY - FIRST 15 MIN: Performed by: OTOLARYNGOLOGY

## 2019-06-17 PROCEDURE — 2500000003 HC RX 250 WO HCPCS: Performed by: NURSE ANESTHETIST, CERTIFIED REGISTERED

## 2019-06-17 PROCEDURE — 3600000013 HC SURGERY LEVEL 3 ADDTL 15MIN: Performed by: OTOLARYNGOLOGY

## 2019-06-17 PROCEDURE — 7100000010 HC PHASE II RECOVERY - FIRST 15 MIN: Performed by: OTOLARYNGOLOGY

## 2019-06-17 PROCEDURE — 3700000001 HC ADD 15 MINUTES (ANESTHESIA): Performed by: OTOLARYNGOLOGY

## 2019-06-17 PROCEDURE — 2580000003 HC RX 258: Performed by: OTOLARYNGOLOGY

## 2019-06-17 PROCEDURE — 6360000002 HC RX W HCPCS: Performed by: ANESTHESIOLOGY

## 2019-06-17 RX ORDER — SODIUM CHLORIDE 9 MG/ML
INJECTION, SOLUTION INTRAVENOUS CONTINUOUS
Status: DISCONTINUED | OUTPATIENT
Start: 2019-06-17 | End: 2019-06-17 | Stop reason: HOSPADM

## 2019-06-17 RX ORDER — LIDOCAINE HYDROCHLORIDE 20 MG/ML
INJECTION, SOLUTION INFILTRATION; PERINEURAL PRN
Status: DISCONTINUED | OUTPATIENT
Start: 2019-06-17 | End: 2019-06-17 | Stop reason: SDUPTHER

## 2019-06-17 RX ORDER — ACETAMINOPHEN AND CODEINE PHOSPHATE 300; 30 MG/1; MG/1
1 TABLET ORAL EVERY 4 HOURS PRN
Qty: 25 TABLET | Refills: 0 | Status: SHIPPED | OUTPATIENT
Start: 2019-06-17 | End: 2019-06-24

## 2019-06-17 RX ORDER — SODIUM CHLORIDE 0.9 % (FLUSH) 0.9 %
10 SYRINGE (ML) INJECTION EVERY 12 HOURS SCHEDULED
Status: DISCONTINUED | OUTPATIENT
Start: 2019-06-17 | End: 2019-06-17 | Stop reason: HOSPADM

## 2019-06-17 RX ORDER — PROPOFOL 10 MG/ML
INJECTION, EMULSION INTRAVENOUS PRN
Status: DISCONTINUED | OUTPATIENT
Start: 2019-06-17 | End: 2019-06-17 | Stop reason: SDUPTHER

## 2019-06-17 RX ORDER — ROCURONIUM BROMIDE 10 MG/ML
INJECTION, SOLUTION INTRAVENOUS PRN
Status: DISCONTINUED | OUTPATIENT
Start: 2019-06-17 | End: 2019-06-17 | Stop reason: SDUPTHER

## 2019-06-17 RX ORDER — METHYLPREDNISOLONE 4 MG/1
4 TABLET ORAL SEE ADMIN INSTRUCTIONS
Qty: 1 KIT | Refills: 0 | Status: SHIPPED | OUTPATIENT
Start: 2019-06-17 | End: 2019-10-29

## 2019-06-17 RX ORDER — FENTANYL CITRATE 50 UG/ML
25 INJECTION, SOLUTION INTRAMUSCULAR; INTRAVENOUS EVERY 5 MIN PRN
Status: DISCONTINUED | OUTPATIENT
Start: 2019-06-17 | End: 2019-06-17 | Stop reason: HOSPADM

## 2019-06-17 RX ORDER — HYDROMORPHONE HCL 110MG/55ML
0.5 PATIENT CONTROLLED ANALGESIA SYRINGE INTRAVENOUS EVERY 5 MIN PRN
Status: DISCONTINUED | OUTPATIENT
Start: 2019-06-17 | End: 2019-06-17 | Stop reason: HOSPADM

## 2019-06-17 RX ORDER — LIDOCAINE HYDROCHLORIDE AND EPINEPHRINE 10; 10 MG/ML; UG/ML
INJECTION, SOLUTION INFILTRATION; PERINEURAL
Status: COMPLETED | OUTPATIENT
Start: 2019-06-17 | End: 2019-06-17

## 2019-06-17 RX ORDER — CLINDAMYCIN HYDROCHLORIDE 300 MG/1
300 CAPSULE ORAL 3 TIMES DAILY
Qty: 21 CAPSULE | Refills: 0 | Status: SHIPPED | OUTPATIENT
Start: 2019-06-17 | End: 2019-06-24

## 2019-06-17 RX ORDER — DEXAMETHASONE SODIUM PHOSPHATE 4 MG/ML
INJECTION, SOLUTION INTRA-ARTICULAR; INTRALESIONAL; INTRAMUSCULAR; INTRAVENOUS; SOFT TISSUE
Status: COMPLETED
Start: 2019-06-17 | End: 2019-06-17

## 2019-06-17 RX ORDER — PROMETHAZINE HYDROCHLORIDE 25 MG/ML
6.25 INJECTION, SOLUTION INTRAMUSCULAR; INTRAVENOUS
Status: DISCONTINUED | OUTPATIENT
Start: 2019-06-17 | End: 2019-06-17 | Stop reason: HOSPADM

## 2019-06-17 RX ORDER — SODIUM CHLORIDE 0.9 % (FLUSH) 0.9 %
10 SYRINGE (ML) INJECTION PRN
Status: DISCONTINUED | OUTPATIENT
Start: 2019-06-17 | End: 2019-06-17 | Stop reason: HOSPADM

## 2019-06-17 RX ORDER — DEXAMETHASONE SODIUM PHOSPHATE 10 MG/ML
10 INJECTION, SOLUTION INTRAMUSCULAR; INTRAVENOUS EVERY 6 HOURS
Status: DISCONTINUED | OUTPATIENT
Start: 2019-06-17 | End: 2019-06-17 | Stop reason: HOSPADM

## 2019-06-17 RX ORDER — SODIUM CHLORIDE 0.9 % (FLUSH) 0.9 %
SYRINGE (ML) INJECTION
Status: DISCONTINUED
Start: 2019-06-17 | End: 2019-06-17 | Stop reason: HOSPADM

## 2019-06-17 RX ORDER — ACETAMINOPHEN AND CODEINE PHOSPHATE 300; 30 MG/1; MG/1
1 TABLET ORAL EVERY 4 HOURS PRN
Status: DISCONTINUED | OUTPATIENT
Start: 2019-06-17 | End: 2019-06-17

## 2019-06-17 RX ORDER — SUCCINYLCHOLINE CHLORIDE 20 MG/ML
INJECTION INTRAMUSCULAR; INTRAVENOUS PRN
Status: DISCONTINUED | OUTPATIENT
Start: 2019-06-17 | End: 2019-06-17 | Stop reason: SDUPTHER

## 2019-06-17 RX ORDER — ONDANSETRON 2 MG/ML
4 INJECTION INTRAMUSCULAR; INTRAVENOUS EVERY 6 HOURS PRN
Status: DISCONTINUED | OUTPATIENT
Start: 2019-06-17 | End: 2019-06-17 | Stop reason: HOSPADM

## 2019-06-17 RX ORDER — FENTANYL CITRATE 50 UG/ML
INJECTION, SOLUTION INTRAMUSCULAR; INTRAVENOUS PRN
Status: DISCONTINUED | OUTPATIENT
Start: 2019-06-17 | End: 2019-06-17 | Stop reason: SDUPTHER

## 2019-06-17 RX ORDER — CLINDAMYCIN PHOSPHATE 900 MG/50ML
900 INJECTION INTRAVENOUS EVERY 8 HOURS
Status: COMPLETED | OUTPATIENT
Start: 2019-06-17 | End: 2019-06-17

## 2019-06-17 RX ORDER — LABETALOL HYDROCHLORIDE 5 MG/ML
5 INJECTION, SOLUTION INTRAVENOUS EVERY 10 MIN PRN
Status: DISCONTINUED | OUTPATIENT
Start: 2019-06-17 | End: 2019-06-17 | Stop reason: HOSPADM

## 2019-06-17 RX ADMIN — SODIUM CHLORIDE: 9 INJECTION, SOLUTION INTRAVENOUS at 07:31

## 2019-06-17 RX ADMIN — PROPOFOL 130 MG: 10 INJECTION, EMULSION INTRAVENOUS at 07:36

## 2019-06-17 RX ADMIN — SODIUM CHLORIDE: 9 INJECTION, SOLUTION INTRAVENOUS at 06:46

## 2019-06-17 RX ADMIN — DEXAMETHASONE SODIUM PHOSPHATE 10 MG: 4 INJECTION, SOLUTION INTRAMUSCULAR; INTRAVENOUS at 08:37

## 2019-06-17 RX ADMIN — Medication 10 ML: at 08:38

## 2019-06-17 RX ADMIN — CLINDAMYCIN PHOSPHATE 900 MG: 900 INJECTION, SOLUTION INTRAVENOUS at 07:28

## 2019-06-17 RX ADMIN — SUCCINYLCHOLINE CHLORIDE 100 MG: 20 INJECTION, SOLUTION INTRAMUSCULAR; INTRAVENOUS at 07:37

## 2019-06-17 RX ADMIN — FENTANYL CITRATE 25 MCG: 50 INJECTION, SOLUTION INTRAMUSCULAR; INTRAVENOUS at 08:38

## 2019-06-17 RX ADMIN — FENTANYL CITRATE 25 MCG: 50 INJECTION, SOLUTION INTRAMUSCULAR; INTRAVENOUS at 07:42

## 2019-06-17 RX ADMIN — LIDOCAINE HYDROCHLORIDE 100 MG: 20 INJECTION, SOLUTION INFILTRATION; PERINEURAL at 07:35

## 2019-06-17 RX ADMIN — ROCURONIUM BROMIDE 5 MG: 10 INJECTION, SOLUTION INTRAVENOUS at 07:37

## 2019-06-17 ASSESSMENT — PULMONARY FUNCTION TESTS
PIF_VALUE: 17
PIF_VALUE: 1
PIF_VALUE: 16
PIF_VALUE: 7
PIF_VALUE: 18
PIF_VALUE: 16
PIF_VALUE: 16
PIF_VALUE: 19
PIF_VALUE: 0
PIF_VALUE: 23
PIF_VALUE: 0
PIF_VALUE: 16
PIF_VALUE: 14
PIF_VALUE: 17
PIF_VALUE: 16
PIF_VALUE: 16
PIF_VALUE: 19
PIF_VALUE: 16
PIF_VALUE: 16
PIF_VALUE: 0
PIF_VALUE: 16
PIF_VALUE: 19
PIF_VALUE: 13
PIF_VALUE: 16
PIF_VALUE: 0
PIF_VALUE: 16
PIF_VALUE: 17
PIF_VALUE: 4
PIF_VALUE: 16
PIF_VALUE: 5
PIF_VALUE: 20

## 2019-06-17 ASSESSMENT — PAIN - FUNCTIONAL ASSESSMENT: PAIN_FUNCTIONAL_ASSESSMENT: 0-10

## 2019-06-17 ASSESSMENT — PAIN SCALES - GENERAL: PAINLEVEL_OUTOF10: 4

## 2019-06-17 NOTE — ANESTHESIA PRE PROCEDURE
[facility] Department of Anesthesiology  Pre-Anesthesia Evaluation/Consultation       Name:  Antonino Newberry  : 3/25/1933  Age:  80 y.o. MRN:  [id number]  Date: 2019           Surgeon: Surgeon(s):  Teto Berry MD    Procedure: Procedure(s):  EXCISE LESION ON RIGHT SIDE CHEEK     Allergies   Allergen Reactions    Cephalexin Hives    Morphine Other (See Comments)     Stops heart     Patient Active Problem List   Diagnosis    Hypothyroidism    Essential hypertension    Osteopenia    Cervical radiculopathy    Thoracic back pain    Gastroesophageal reflux disease without esophagitis    Acquired hypothyroidism    PVD (peripheral vascular disease) ([address]    History of total right knee replacement-3.     Extremity atherosclerosis with intermittent claudication (HCC)    Controlled type 2 diabetes mellitus with diabetic nephropathy, without long-term current use of insulin (HCC)    Paroxysmal atrial fibrillation (HCC)    Thyroid nodule    Atherosclerosis of native arteries of extremities with rest pain, bilateral legs (HCC)    Spondylosis of lumbar spine    Benign neoplasm of minor salivary gland    Chronic kidney disease, stage III (moderate) (HCC)     Past Medical History:   Diagnosis Date    COPD (chronic obstructive pulmonary disease) (HCC)     DDD (degenerative disc disease)     Diabetes mellitus ([address]     Hypertension     Macular degeneration     bilateral    PVD (peripheral vascular disease) ([address]     Reflux     Thyroid disease      Past Surgical History:   Procedure Laterality Date    ABDOMEN SURGERY      CATARACT REMOVAL WITH IMPLANT Right     CHOLECYSTECTOMY      COLON SURGERY      small bowel polyps    EYE SURGERY      for macular deg    HYSTERECTOMY      JOINT REPLACEMENT Right 2016    knee    KNEE ARTHROSCOPY  8-20-15    right medial menisectomy loose body.      KNEE ARTHROSCOPY Right 2015    RIGHT KNEE ARTHROSCOPIC PARTIAL MENISECTOMY AND REMOVAL OF LOOSE BODY    MS EXCIS MOUTH MUCOSA/SUB,NO REPAIR Right 10/31/2018    EXCISION MASS RIGHT BUCCAL MUCOSA performed by Nel Clarke MD at [address] PRE-MALIGNANT / [address]  3/4/11    inside right cheek    SINUS SURGERY      TONSILLECTOMY       Social History     Tobacco Use    Smoking status: Former Smoker    Smokeless tobacco: Never Used    Tobacco comment: quit [id number]   Substance Use Topics    Alcohol use: No    Drug use: No     Medications  No current facility-administered medications on file prior to encounter. Current Outpatient Medications on File Prior to Encounter   Medication Sig Dispense Refill    apixaban (ELIQUIS) 2.5 MG TABS tablet Take by mouth 2 times daily      Calcium Carbonate-Vitamin D (CALTRATE 600+D PO) Take 1 tablet by mouth 2 times daily.  Multiple Vitamin (MULTIVITAMIN PO) Take  by mouth daily.       Calcium Polycarbophil (FIBER) 625 MG TABS Take 1 tablet by mouth 2 times daily 60 tablet 5     Current Facility-Administered Medications   Medication Dose Route Frequency Provider Last Rate Last Dose    0.9 % sodium chloride infusion   Intravenous Continuous Nel Clarke  mL/hr at 19 0646      clindamycin (CLEOCIN) 900 mg in dextrose 5 % 50 mL IVPB  900 mg Intravenous Q8H Nel Clarke MD         Vital Signs (Current)   Vitals:    19 0625   BP: (!) 155/69   Pulse: 68   Resp: 14   Temp: 98.5 °F (36.9 °C)   SpO2: 100%     Vital Signs Statistics (for past 48 hrs)     Temp  Av.5 °F (36.9 °C)  Min: 98.5 °F (36.9 °C)   Min taken time: 1925  Max: 98.5 °F (36.9 °C)   Max taken time: 19 0625  Pulse  Av  Min: 76   Min taken time: 19 0625  Max: 76   Max taken time: 19 0625  Resp  Av  Min: 15   Min taken time: 19 6836  Max: 14   Max taken time: 19 0625  BP  Min: 155/69   Min taken time: 19 7489  Max: 155/69   Max taken time: 19 06  SpO2  Av %  Min: 100 %   Min taken time: 19 0625  Max: 100 %   Max taken time: 19 0625    BP Readings from Last 3 Encounters:   19 (!) 155/69   19 130/70   19 130/70     BMI  Body mass index is 21.95 kg/m². Estimated body mass index is 21.95 kg/m² as calculated from the following:    Height as of this encounter: 5' 2\" (1.575 m). Weight as of this encounter: 120 lb (54.4 kg). CBC   Lab Results   Component Value Date    WBC 6.4 2019    RBC 4.24 2019    HGB 12.7 2019    HCT 37.4 2019    MCV 88.2 2019    RDW 13.3 2019     2019     CMP    Lab Results   Component Value Date     2019    K 4.6 2019     2019    CO2 28 2019    BUN 26 2019    CREATININE 1.6 2018    CREATININE 1.8 2018    GFRAA 29 2019    GFRAA >60 08/10/2011    AGRATIO 1.2 2017    LABGLOM 24 2019    LABGLOM 44 2016    LABGLOM 44 2016    GLUCOSE 104 2019    PROT 6.7 2017    PROT 7.3 08/10/2011    CALCIUM 9.2 2019    BILITOT 0.3 2017    ALKPHOS 69 2017    AST 13 2017    ALT 12 2017     BMP    Lab Results   Component Value Date     2019    K 4.6 2019     2019    CO2 28 2019    BUN 26 2019    CREATININE 1.6 2018    CREATININE 1.8 2018    CALCIUM 9.2 2019    GFRAA 29 2019    GFRAA >60 08/10/2011    LABGLOM 24 2019    LABGLOM 44 2016    LABGLOM 44 2016    GLUCOSE 104 2019     POCGlucose  No results for input(s): GLUCOSE in the last 72 hours.    [facility]    Lab Results   Component Value Date    PROTIME 11.3 2018    INR 1.00 2018    APTT 33.0      HCG (If Applicable) No results found for: PREGTESTUR, PREGSERUM, HCG, HCGQUANT   ABGs No results found for: PHART, PO2ART, JTN0DKR, HEJ2HYO, BEART, Q5UXFSLJ   Type & Screen (If Applicable)  No results found for: LABABO, LABRH                         BMI: Wt Readings from Last 3 Encounters:       NPO Status:   Date of last liquid consumption: 06/16/19   Time of last liquid consumption: 2100   Date of last solid food consumption: 06/16/19      Time of last solid consumption: 2000       Anesthesia Evaluation  Patient summary reviewed no history of anesthetic complications:   Airway: Mallampati: II  TM distance: >3 FB   Neck ROM: full   Dental:    (+) partials      Pulmonary:normal exam    (+) COPD:                             Cardiovascular:  Exercise tolerance: poor (<4 METS),   (+) hypertension:,         Rhythm: irregular  Rate: normal           Beta Blocker:  Dose within 24 Hrs         Neuro/Psych:   (+) neuromuscular disease:, TIA (no residual sx per pt),             GI/Hepatic/Renal:   (+) GERD:, renal disease: CRI,           Endo/Other:    (+) DiabetesType II DM, , hypothyroidism, blood dyscrasia (plavix, eliquis last dose last Monday): anticoagulation therapy:., .                 Abdominal:           Vascular: negative vascular ROS. Anesthesia Plan      general     ASA 3       Induction: intravenous. MIPS: Postoperative opioids intended and Prophylactic antiemetics administered. Anesthetic plan and risks discussed with patient and child/children. Plan discussed with CRNA. This pre-anesthesia assessment may be used as a history and physical.    DOS STAFF ADDENDUM:    Pt seen and examined, chart reviewed (including anesthesia, drug and allergy history). No interval changes to history and physical examination. Anesthetic plan, risks, benefits, alternatives, and personnel involved discussed with patient. Patient verbalized an understanding and agrees to proceed.       Emy Harper MD  June 17, 2019  6:52 AM

## 2019-06-17 NOTE — ANESTHESIA POSTPROCEDURE EVALUATION
[facility] Department of Anesthesiology  Post-Anesthesia Note       Name:  Jaquan Figueroa                                  Age:  80 y.o. MRN:  [id number]     Last Vitals & Oxygen Saturation: BP 90/68   Pulse 75   Temp 97 °F (36.1 °C) (Temporal)   Resp 20   Ht 5' 2\" (1.575 m)   Wt 120 lb (54.4 kg)   SpO2 100%   BMI 21.95 kg/m²   Patient Vitals for the past 4 hrs:   BP Temp Temp src Pulse Resp SpO2 Height Weight   06/17/19 0830 90/68 -- -- 75 20 100 % -- --   06/17/19 0815 (!) 110/58 97 °F (36.1 °C) Temporal 74 20 95 % -- --   06/17/19 0810 88/69 -- -- 76 20 95 % -- --   06/17/19 0805 -- 96.8 °F (36 °C) Temporal 73 20 -- -- --   06/17/19 0625 (!) 155/69 98.5 °F (36.9 °C) Temporal 68 14 100 % -- --   06/17/19 6396 -- -- -- -- -- -- 5' 2\" (1.575 m) 120 lb (54.4 kg)       Level of consciousness:  Awake, alert    Respiratory: Respirations easy, no distress. Stable. Cardiovascular: Hemodynamically stable. Hydration: Adequate. PONV: Adequately managed. Post-op pain: Adequately controlled. Post-op assessment: Tolerated anesthetic well without complication. Complications:  None.     Shane Tellez MD  June 17, 2019   9:17 AM

## 2019-06-17 NOTE — OP NOTE
was  removed. The patient was suctioned completely at the end of the  procedure. She tolerated the procedure well and was sent to the  recovery room in good condition.         Efrem Butt    D: 06/17/2019 8:10:01       T: 06/17/2019 8:42:28     [email or web address]  Job#: [id number]     Doc#: [id number]    CC:

## 2019-06-24 ENCOUNTER — OFFICE VISIT (OUTPATIENT)
Dept: ENT CLINIC | Age: 84
End: 2019-06-24

## 2019-06-24 VITALS — DIASTOLIC BLOOD PRESSURE: 62 MMHG | OXYGEN SATURATION: 96 % | HEART RATE: 77 BPM | SYSTOLIC BLOOD PRESSURE: 120 MMHG

## 2019-06-24 DIAGNOSIS — K13.79 ORAL MASS: Primary | ICD-10-CM

## 2019-06-24 PROCEDURE — 99024 POSTOP FOLLOW-UP VISIT: CPT | Performed by: OTOLARYNGOLOGY

## 2019-06-24 NOTE — PROGRESS NOTES
Patient seems to be doing reasonably well though she apparently had some esophagitis after the initial surgery which may be related to her medication being the clindamycin. Otherwise, she continues to do well. Healing is appropriate. We will be watching this for recurrence. I have described to her that the alternative of extensive surgery would be inappropriate at her age and medical condition. Radiation would not be appropriate for this form of lesion. I will see her again in 1 month.

## 2019-07-22 ENCOUNTER — OFFICE VISIT (OUTPATIENT)
Dept: ENT CLINIC | Age: 84
End: 2019-07-22

## 2019-07-22 VITALS — DIASTOLIC BLOOD PRESSURE: 72 MMHG | SYSTOLIC BLOOD PRESSURE: 128 MMHG | HEART RATE: 62 BPM | OXYGEN SATURATION: 96 %

## 2019-07-22 DIAGNOSIS — D10.39: Primary | ICD-10-CM

## 2019-07-22 PROCEDURE — 99024 POSTOP FOLLOW-UP VISIT: CPT | Performed by: OTOLARYNGOLOGY

## 2019-08-01 ENCOUNTER — OFFICE VISIT (OUTPATIENT)
Dept: ORTHOPEDIC SURGERY | Age: 84
End: 2019-08-01
Payer: MEDICARE

## 2019-08-01 VITALS
RESPIRATION RATE: 16 BRPM | DIASTOLIC BLOOD PRESSURE: 72 MMHG | HEIGHT: 62 IN | HEART RATE: 62 BPM | WEIGHT: 120 LBS | TEMPERATURE: 97.7 F | SYSTOLIC BLOOD PRESSURE: 128 MMHG | BODY MASS INDEX: 22.08 KG/M2

## 2019-08-01 DIAGNOSIS — Z96.651 HISTORY OF TOTAL RIGHT KNEE REPLACEMENT: Primary | ICD-10-CM

## 2019-08-01 PROCEDURE — 1036F TOBACCO NON-USER: CPT | Performed by: PHYSICIAN ASSISTANT

## 2019-08-01 PROCEDURE — 1090F PRES/ABSN URINE INCON ASSESS: CPT | Performed by: PHYSICIAN ASSISTANT

## 2019-08-01 PROCEDURE — 1123F ACP DISCUSS/DSCN MKR DOCD: CPT | Performed by: PHYSICIAN ASSISTANT

## 2019-08-01 PROCEDURE — G8420 CALC BMI NORM PARAMETERS: HCPCS | Performed by: PHYSICIAN ASSISTANT

## 2019-08-01 PROCEDURE — 99213 OFFICE O/P EST LOW 20 MIN: CPT | Performed by: PHYSICIAN ASSISTANT

## 2019-08-01 PROCEDURE — G8598 ASA/ANTIPLAT THER USED: HCPCS | Performed by: PHYSICIAN ASSISTANT

## 2019-08-01 PROCEDURE — 4040F PNEUMOC VAC/ADMIN/RCVD: CPT | Performed by: PHYSICIAN ASSISTANT

## 2019-08-01 PROCEDURE — G8427 DOCREV CUR MEDS BY ELIG CLIN: HCPCS | Performed by: PHYSICIAN ASSISTANT

## 2019-08-08 NOTE — PROGRESS NOTES
This dictation was done with [employer] dictation and may contain mechanical errors related to translation. The review of systems was currently provided by the patient and reviewed with the medical assistant at today's visit. Please see media. Subjective:  Gavin Renner is a 80 y.o. who is here this is a pleasant 51-year-old female who is here a year out from right total knee replacement on 3/4/2016. She has been coming on a yearly basis and overall has been doing extremely well she states today she has 0 out of 10 pain and no new complaints and concerns. 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    Extremity atherosclerosis with intermittent claudication (HCC)    Controlled type 2 diabetes mellitus with diabetic nephropathy, without long-term current use of insulin (HCC)    Paroxysmal atrial fibrillation (HCC)    Thyroid nodule    Atherosclerosis of native arteries of extremities with rest pain, bilateral legs (HCC)    Spondylosis of lumbar spine    Benign neoplasm of minor salivary gland    Chronic kidney disease, stage III (moderate) ([facility])           Current Outpatient Medications on File Prior to Visit   Medication Sig Dispense Refill    methylPREDNISolone (MEDROL, SEBASTIAN,) 4 MG tablet Take 1 tablet by mouth See Admin Instructions Take by mouth.  1 kit 0    pantoprazole (PROTONIX) 40 MG tablet Take 1 tablet by mouth daily 90 tablet 0    levothyroxine (SYNTHROID) 50 MCG tablet Take 1 tablet by mouth daily 90 tablet 0    clopidogrel (PLAVIX) 75 MG tablet Take 1 tablet by mouth daily 90 tablet 0    metoprolol tartrate (LOPRESSOR) 50 MG tablet Take 1 tablet by mouth daily 90 tablet 0    apixaban (ELIQUIS) 2.5 MG TABS tablet Take by mouth 2 times daily      Calcium Polycarbophil (FIBER) 625 MG TABS Take 1 tablet by mouth 2 times daily 60 tablet 5    Calcium Carbonate-Vitamin D (CALTRATE 600+D PO) Take 1 tablet by mouth 2 times daily.  Multiple Vitamin (MULTIVITAMIN PO) Take  by mouth daily. No current facility-administered medications on file prior to visit. Objective:   Blood pressure 128/72, pulse 62, temperature 97.7 °F (36.5 °C), resp. rate 16, height 5' 2\" (1.575 m), weight 120 lb (54.4 kg), not currently breastfeeding. On examination this is a very pleasant 80-year-old female no acute distress she is alert and oriented x3 she has 0 to 130 degrees of motion in the right knee without varus or valgus laxity. She has a well-healed incision good quad tone good hamstring flexibility. She is negative for varus or valgus laxity she has good distal pulses good dorsiflexion and plantarflexion strength of the right foot. Neuro exam grossly intact both lower extremities. Intact sensation to light touch. Motor exam 4+ to 5/5 in all major motor groups. Negative Hernandez's sign. Skin is warm, dry and intact with out erythema or significant increased temperature around the knee joint(s). There are no cutaneous lesions or lymphadenopathy present. X-RAYS:  X-rays taken at the office today show stable right total knee replacement. Xray three views of the total knee arthroplasty reveals satisfactory alignment of the prosthesis . No signs of significant polyethylene wear or failure. No progressive radiolucencies,fractures, tumors or dislocations. Stable      Assessment:  Right total knee replacement    Plan:  During today's visit, there was approximately 15 minutes of face-to-face discussion in regards to the patient's current condition and treatment options.  More than 50 % of the time was counseling and

## 2019-10-29 ENCOUNTER — OFFICE VISIT (OUTPATIENT)
Dept: ENT CLINIC | Age: 84
End: 2019-10-29

## 2019-10-29 VITALS — DIASTOLIC BLOOD PRESSURE: 70 MMHG | OXYGEN SATURATION: 96 % | SYSTOLIC BLOOD PRESSURE: 146 MMHG | HEART RATE: 79 BPM

## 2019-10-29 DIAGNOSIS — D10.39: Primary | ICD-10-CM

## 2019-10-29 PROCEDURE — 99024 POSTOP FOLLOW-UP VISIT: CPT | Performed by: OTOLARYNGOLOGY

## 2019-11-26 ENCOUNTER — HOSPITAL ENCOUNTER (OUTPATIENT)
Dept: GENERAL RADIOLOGY | Age: 84
Discharge: HOME | End: 2019-11-26
Payer: MEDICARE

## 2019-11-26 ENCOUNTER — HOSPITAL ENCOUNTER (OUTPATIENT)
Age: 84
Discharge: HOME | End: 2019-11-26
Payer: MEDICARE

## 2019-11-26 DIAGNOSIS — M47.9: ICD-10-CM

## 2019-11-26 DIAGNOSIS — I10 ESSENTIAL HYPERTENSION: ICD-10-CM

## 2019-11-26 DIAGNOSIS — I47.1 SUPRAVENTRICULAR TACHYCARDIA (HCC): ICD-10-CM

## 2019-11-26 LAB
ANION GAP SERPL CALCULATED.3IONS-SCNC: 12 MMOL/L (ref 3–16)
BUN BLDV-MCNC: 29 MG/DL (ref 7–20)
CALCIUM SERPL-MCNC: 9.3 MG/DL (ref 8.3–10.6)
CHLORIDE BLD-SCNC: 97 MMOL/L (ref 99–110)
CO2: 26 MMOL/L (ref 21–32)
CREAT SERPL-MCNC: 1.9 MG/DL (ref 0.6–1.2)
EKG ATRIAL RATE: 66 BPM
EKG DIAGNOSIS: NORMAL
EKG P AXIS: 68 DEGREES
EKG P-R INTERVAL: 154 MS
EKG Q-T INTERVAL: 402 MS
EKG QRS DURATION: 80 MS
EKG QTC CALCULATION (BAZETT): 421 MS
EKG R AXIS: 47 DEGREES
EKG T AXIS: 50 DEGREES
EKG VENTRICULAR RATE: 66 BPM
GFR AFRICAN AMERICAN: 30
GFR NON-AFRICAN AMERICAN: 25
GLUCOSE BLD-MCNC: 112 MG/DL (ref 70–99)
POTASSIUM SERPL-SCNC: 5.3 MMOL/L (ref 3.5–5.1)
SODIUM BLD-SCNC: 135 MMOL/L (ref 136–145)
TSH SERPL DL<=0.05 MIU/L-ACNC: 1.77 UIU/ML (ref 0.27–4.2)

## 2019-11-26 PROCEDURE — 80048 BASIC METABOLIC PNL TOTAL CA: CPT

## 2019-11-26 PROCEDURE — 36415 COLL VENOUS BLD VENIPUNCTURE: CPT

## 2019-11-26 PROCEDURE — 93010 ELECTROCARDIOGRAM REPORT: CPT | Performed by: INTERNAL MEDICINE

## 2019-11-26 PROCEDURE — 72110 X-RAY EXAM L-2 SPINE 4/>VWS: CPT

## 2019-11-26 PROCEDURE — 84443 ASSAY THYROID STIM HORMONE: CPT

## 2019-11-26 PROCEDURE — 93005 ELECTROCARDIOGRAM TRACING: CPT

## 2019-11-26 PROCEDURE — 72070 X-RAY EXAM THORAC SPINE 2VWS: CPT

## 2019-12-10 ENCOUNTER — HOSPITAL ENCOUNTER (OUTPATIENT)
Dept: PHYSICAL THERAPY | Age: 84
Setting detail: THERAPIES SERIES
Discharge: HOME | End: 2019-12-10
Payer: MEDICARE

## 2019-12-10 PROCEDURE — 97161 PT EVAL LOW COMPLEX 20 MIN: CPT

## 2019-12-10 PROCEDURE — 97530 THERAPEUTIC ACTIVITIES: CPT

## 2019-12-10 PROCEDURE — 97140 MANUAL THERAPY 1/> REGIONS: CPT

## 2019-12-10 PROCEDURE — 97110 THERAPEUTIC EXERCISES: CPT

## 2019-12-10 ASSESSMENT — PAIN SCALES - GENERAL: PAINLEVEL_OUTOF10: 3

## 2019-12-10 ASSESSMENT — PAIN DESCRIPTION - ORIENTATION: ORIENTATION: RIGHT;LEFT;OUTER;LOWER

## 2019-12-10 ASSESSMENT — PAIN DESCRIPTION - PAIN TYPE: TYPE: CHRONIC PAIN

## 2019-12-10 ASSESSMENT — PAIN DESCRIPTION - DESCRIPTORS: DESCRIPTORS: CONSTANT

## 2019-12-10 ASSESSMENT — PAIN DESCRIPTION - FREQUENCY: FREQUENCY: CONTINUOUS

## 2019-12-10 ASSESSMENT — PAIN DESCRIPTION - LOCATION: LOCATION: BACK;HIP

## 2019-12-12 ENCOUNTER — HOSPITAL ENCOUNTER (OUTPATIENT)
Dept: PHYSICAL THERAPY | Age: 84
Setting detail: THERAPIES SERIES
Discharge: HOME | End: 2019-12-12
Payer: MEDICARE

## 2019-12-12 PROCEDURE — 97110 THERAPEUTIC EXERCISES: CPT

## 2019-12-12 PROCEDURE — 97140 MANUAL THERAPY 1/> REGIONS: CPT

## 2019-12-16 ENCOUNTER — HOSPITAL ENCOUNTER (OUTPATIENT)
Dept: PHYSICAL THERAPY | Age: 84
Setting detail: THERAPIES SERIES
Discharge: HOME | End: 2019-12-16
Payer: MEDICARE

## 2019-12-16 PROCEDURE — 97112 NEUROMUSCULAR REEDUCATION: CPT

## 2019-12-16 PROCEDURE — 97110 THERAPEUTIC EXERCISES: CPT

## 2019-12-19 ENCOUNTER — HOSPITAL ENCOUNTER (OUTPATIENT)
Dept: PHYSICAL THERAPY | Age: 84
Setting detail: THERAPIES SERIES
Discharge: HOME | End: 2019-12-19
Payer: MEDICARE

## 2019-12-19 PROCEDURE — 97110 THERAPEUTIC EXERCISES: CPT

## 2019-12-26 ENCOUNTER — APPOINTMENT (OUTPATIENT)
Dept: PHYSICAL THERAPY | Age: 84
End: 2019-12-26
Payer: MEDICARE

## 2019-12-31 ENCOUNTER — HOSPITAL ENCOUNTER (OUTPATIENT)
Dept: PHYSICAL THERAPY | Age: 84
Setting detail: THERAPIES SERIES
Discharge: HOME | End: 2019-12-31
Payer: MEDICARE

## 2019-12-31 PROCEDURE — 97110 THERAPEUTIC EXERCISES: CPT

## 2019-12-31 NOTE — FLOWSHEET NOTE
thoracic-lumbar PS.  scap squeeze and mid rows reproduce pt's pain complaint. 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   per pt \"I have circulation problems - the blood pools in my right leg and a blockage in my left leg that they can't fix\"    Exercises/Interventions:     Therapeutic Exercises (77473) Resistance / level Sets/sec Reps Notes   Nu-step  Or   bike L1, 4 min   Nu step s=7 arms = 11  sci fit nu step s= 16. Arms = 6   IB/HR/TR 2x30\"/1x10/1x10      Steps      Step ups                       HS stretches 2x30\"   Hip flex [id number]\"    4\" frd step ups: 10x ea R/L                 12/19 stopped step ups due to R hip pain   Mat ex    Min cues   SB supine    Green ball            cables Mid rows x 10 2 plates    3 way kicks at cables with 2# ankle weights x 10 each                    Therapeutic Activities (93280)                                          Neuromuscular Re-ed (46145)                                                 Manual Intervention (33552) See below                                                    Manual: 12/31:  None this date    12/12 trial IASTM B lumbar PS, glut max/med/piriformis in R s/l    Pt. Education: 12/31:  Educated on posture correction, sitting with lumbar towel roll and frequent stretching.    -12/12 review HEP  12/10 patient educated on diagnosis, prognosis and expectations for rehab  -all patient questions were answered    HEP instruction: 12/31:  Cleopatra Padilla on using lumbar towel roll when sitting at home and in car  -12/10 TrA iso, Bridge, SKTC, HS stretch- supine with towel  patient provided with written and illustrated instructions for HEP     Therapeutic Exercise and NMR EXR  [x] (24921) Provided verbal/tactile cueing for activities related to strengthening, flexibility, endurance, ROM for improvements in  [x] LE / Lumbar: LE, proximal hip, and core control with self care, mobility, lifting, ambulation. [] UE / Cervical: cervical, postural, scapular, scapulothoracic and UE control with self care, reaching, carrying, lifting, house/yardwork, driving, computer work. [x] (25140) Provided verbal/tactile cueing for activities related to improving balance, coordination, kinesthetic sense, posture, motor skill, proprioception to assist with   [x] LE / lumbar: LE, proximal hip, and core control in self care, mobility, lifting, ambulation and eccentric single leg control. [] UE / cervical: cervical, scapular, scapulothoracic and UE control with self care, reaching, carrying, lifting, house/yardwork, driving, computer work.   [] (16613) Therapist is in constant attendance of 2 or more patients providing skilled therapy interventions, but not providing any significant amount of measurable one-on-one time to either patient, for improvements in  [] LE / lumbar: LE, proximal hip, and core control in self care, mobility, lifting, ambulation and eccentric single leg control. [] UE / cervical: cervical, scapular, scapulothoracic and UE control with self care, reaching, carrying, lifting, house/yardwork, driving, computer work.      NMR and Therapeutic Activities:    [] (09933 or 63810) Provided verbal/tactile cueing for activities related to improving balance, coordination, kinesthetic sense, posture, motor skill, proprioception and motor activation to allow for proper function of   [] LE: / Lumbar core, proximal hip and LE with self care and ADLs  [] UE / Cervical: cervical, postural, scapular, scapulothoracic and UE control with self care, carrying, lifting, driving, computer work.   [] (37900) Gait Re-education- Provided training and instruction to the patient for proper LE, core and proximal hip recruitment and positioning and eccentric body weight control with ambulation re-education including up and down stairs     Home Management Training / Self Care:  [] (35084) Provided self-care/home management training related to activities of daily living and compensatory training, and/or use of adaptive equipment for improvement with: ADLs and compensatory training, meal preparation, safety procedures and instruction in use of adaptive equipment, including bathing, grooming, dressing, personal hygiene, basic household cleaning and chores. Home Exercise Program:    [x] (68641) Reviewed/Progressed HEP activities related to strengthening, flexibility, endurance, ROM of   [x] LE / Lumbar: core, proximal hip and LE for functional self-care, mobility, lifting and ambulation/stair navigation   [] UE / Cervical: cervical, postural, scapular, scapulothoracic and UE control with self care, reaching, carrying, lifting, house/yardwork, driving, computer work  [x] (92281)Reviewed/Progressed HEP activities related to improving balance, coordination, kinesthetic sense, posture, motor skill, proprioception of   [x] LE: core, proximal hip and LE for self care, mobility, lifting, and ambulation/stair navigation    [] UE / Cervical: cervical, postural,  scapular, scapulothoracic and UE control with self care, reaching, carrying, lifting, house/yardwork, driving, computer work    Manual Treatments:  PROM / STM / Oscillations-Mobs:  G-I, II, III, IV (PA's, Inf., Post.)  [] (32913) Provided manual therapy to mobilize LE, proximal hip and/or LS spine soft tissue/joints for the purpose of modulating pain, promoting relaxation,  increasing ROM, reducing/eliminating soft tissue swelling/inflammation/restriction, improving soft tissue extensibility and allowing for proper ROM for normal function with   [] LE / lumbar: self care, mobility, lifting and ambulation. [] UE / Cervical: self care, reaching, carrying, lifting, house/yardwork, driving, computer work.      Modalities:  [] (63338) Vasopneumatic compression: Utilized vasopneumatic compression to decrease edema / swelling for the purpose of improving mobility and quad tone / recruitment which Limitations/Impairments:  [x]Sleeping [x]Sitting               [x]Standing [x]Transfers        [x]Walking [x]Kneeling               [x]Stairs [x]Squatting / bending   [x]ADLs [x]Reaching  [x]Lifting  [x]Housework  []Driving []Job related tasks  [x]Sports/Recreation []Other:        ASSESSMENT:  12/31: Pt tolerated session well this date; decreased overall pain levels from last visit. Pt still unable to determine what causes onset of pain. Treatment/Activity Tolerance:  [x] Patient able to complete tx [] Patient limited by fatigue  [] Patient limited by pain  [] Patient limited by other medical complications  [] Other:     Prognosis: [x] Good [] Fair  [] Poor    Patient Requires Follow-up: [x] Yes  [] No    Plan for next treatment session: stretching of trunk/hips/LE, ROM, posture/body mechs, lumbar stabs, LE/trunk strength, HEP, MOC, currently pt declines aquatics due to fear of water, gait /balance    PLAN: See [facility] PT 2x / week for 6 weeks. [x] Continue per plan of care [] Alter current plan (see comments)  [] Plan of care initiated [] Hold pending MD visit [] Discharge    Electronically signed by: Nancy Garcia PT, DPT    Note: If patient does not return for scheduled/ recommended follow up visits, his note will serve as a discharge from care along with most recent update on progress.

## 2020-01-03 ENCOUNTER — HOSPITAL ENCOUNTER (OUTPATIENT)
Dept: PHYSICAL THERAPY | Age: 85
Setting detail: THERAPIES SERIES
Discharge: HOME | End: 2020-01-03
Payer: MEDICARE

## 2020-01-03 PROCEDURE — 97110 THERAPEUTIC EXERCISES: CPT

## 2020-01-03 NOTE — FLOWSHEET NOTE
tender to palpation at LT/MT/rhomboids, levator, post RTC, thoracic-lumbar PS.  scap squeeze and mid rows reproduce pt's pain complaint. 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   per pt \"I have circulation problems - the blood pools in my right leg and a blockage in my left leg that they can't fix\"    Exercises/Interventions:     Therapeutic Exercises (96892) Resistance / level Sets/sec Reps Notes   Nu-step  Or   bike L3, 4 min   Nu step s=7 arms = 11  sci fit nu step s= 16. Arms = 6   IB/HR/TR 2x30\"/1x10/1x10      Steps      Step ups                       HS stretches 2x30\"   Hip flex stretch 2x30\"    4\" frd step ups: 12x ea R/L                 12/19 stopped step ups due to R hip pain   Mat ex    Min cues   SB supine    Green ball            cables Mid rows x 2 plates    3 way kicks at cables with 1. pl  x 10 each 1      1 10 ea      10 ea       B                 Therapeutic Activities (25247)                                          Neuromuscular Re-ed (73253)                                                 Manual Intervention (65157)                                                     Manual: 12/31:  None this date    12/12 trial IASTM B lumbar PS, glut max/med/piriformis in R s/l    Pt. Education: 12/31:  Educated on posture correction, sitting with lumbar towel roll and frequent stretching.    -12/12 review HEP    -all patient questions were answered    HEP instruction: 1/3/2020  Added piriformis stretch to H EP  12/31:  Instructed on using lumbar towel roll when sitting at home and in car  -12/10 TrA iso, Bridge, SKTC, HS stretch- supine with towel  patient provided with written and illustrated instructions for HEP     Therapeutic Exercise and NMR EXR  [x] (66553) Provided verbal/tactile cueing for activities related to strengthening, flexibility, endurance, ROM for improvements in  [x] LE / Lumbar: LE, proximal hip, and core Limitations/Impairments:  [x]Sleeping [x]Sitting               [x]Standing [x]Transfers        [x]Walking [x]Kneeling               [x]Stairs [x]Squatting / bending   [x]ADLs [x]Reaching  [x]Lifting  [x]Housework  []Driving []Job related tasks  [x]Sports/Recreation []Other:        ASSESSMENT:  12/31: Pt tolerated session well this date; decreased overall pain levels from last visit. Pt still unable to determine what causes onset of pain. Treatment/Activity Tolerance:  [x] Patient able to complete tx [] Patient limited by fatigue  [] Patient limited by pain  [] Patient limited by other medical complications  [] Other:     Prognosis: [x] Good [] Fair  [] Poor    Patient Requires Follow-up: [x] Yes  [] No    Plan for next treatment session: stretching of trunk/hips/LE, ROM, posture/body mechs, lumbar stabs, LE/trunk strength, HEP, MOC, currently pt declines aquatics due to fear of water, gait /balance    PLAN: See eval. PT 2x / week for 6 weeks. [x] Continue per plan of care [] Alter current plan (see comments)  [] Plan of care initiated [] Hold pending MD visit [] Discharge    Electronically signed by: Rush Luis PT, DPT    Note: If patient does not return for scheduled/ recommended follow up visits, his note will serve as a discharge from care along with most recent update on progress.

## 2020-01-07 ENCOUNTER — HOSPITAL ENCOUNTER (OUTPATIENT)
Dept: PHYSICAL THERAPY | Age: 85
Setting detail: THERAPIES SERIES
Discharge: HOME | End: 2020-01-07
Payer: MEDICARE

## 2020-01-07 PROCEDURE — 97110 THERAPEUTIC EXERCISES: CPT

## 2020-01-07 NOTE — FLOWSHEET NOTE
her and her legs feel shaky. OBJECTIVE:  12/31:  Pt demonstrating fair posture; slight rounded shoulders and slumped posture when sitting   12/19pt with frequent c/o pain t/o tx session. Palpate: tender R UT. Pt denies tender to palpation at LT/MT/rhomboids, levator, post RTC, thoracic-lumbar PS.  scap squeeze and mid rows reproduce pt's pain complaint. 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   per pt \"I have circulation problems - the blood pools in my right leg and a blockage in my left leg that they can't fix\"    Exercises/Interventions:     Therapeutic Exercises (99800) Resistance / level Sets/sec Reps Notes   Nu-step  Or   bike sci fit Nustep 5 min L1   Nu step s=7 arms = 11  sci fit nu step s= 16. Arms = 6   IB/HR/TR 2x30\"/1x15/1x15      Steps      Step ups                       HS stretches 2x30\"   Hip flex stretch 2x30\"    4\" frd step ups: 15x ea R/L                 12/19 stopped step ups due to R hip pain   Mat ex hooklying fig 4 piriformis stretch: 2x30\" B  TrA iso 10x5\"   Min cues   SB supine DKTC 10 x5\"   orange ball            cables Mid rows x 2 plates    3 way kicks at cables with 1. pl  x 10 each 2      1 10 ea      10 ea       B                 Therapeutic Activities (67693)                                          Neuromuscular Re-ed (46748)                                                 Manual Intervention (92312)                                                     Manual: 12/31:  None this date    12/12 trial IASTM B lumbar PS, glut max/med/piriformis in R s/l    Pt. Education: 12/31:  Educated on posture correction, sitting with lumbar towel roll and frequent stretching.    -12/12 review HEP    -all patient questions were answered    HEP instruction: 1/3/2020  Added piriformis stretch to H EP  12/31:  Instructed on using lumbar towel roll when sitting at home and in car  -12/10 TrA iso, Bridge, SKTC, HS stretch- supine with towel  patient provided with written and illustrated instructions for HEP     Therapeutic Exercise and NMR EXR  [x] (30115) Provided verbal/tactile cueing for activities related to strengthening, flexibility, endurance, ROM for improvements in  [x] LE / Lumbar: LE, proximal hip, and core control with self care, mobility, lifting, ambulation. [] UE / Cervical: cervical, postural, scapular, scapulothoracic and UE control with self care, reaching, carrying, lifting, house/yardwork, driving, computer work. [x] (63535) Provided verbal/tactile cueing for activities related to improving balance, coordination, kinesthetic sense, posture, motor skill, proprioception to assist with   [x] LE / lumbar: LE, proximal hip, and core control in self care, mobility, lifting, ambulation and eccentric single leg control. [] UE / cervical: cervical, scapular, scapulothoracic and UE control with self care, reaching, carrying, lifting, house/yardwork, driving, computer work.   [] (87894) Therapist is in constant attendance of 2 or more patients providing skilled therapy interventions, but not providing any significant amount of measurable one-on-one time to either patient, for improvements in  [] LE / lumbar: LE, proximal hip, and core control in self care, mobility, lifting, ambulation and eccentric single leg control. [] UE / cervical: cervical, scapular, scapulothoracic and UE control with self care, reaching, carrying, lifting, house/yardwork, driving, computer work.      NMR and Therapeutic Activities:    [] (06770 or 36175) Provided verbal/tactile cueing for activities related to improving balance, coordination, kinesthetic sense, posture, motor skill, proprioception and motor activation to allow for proper function of   [] LE: / Lumbar core, proximal hip and LE with self care and ADLs  [] UE / Cervical: cervical, postural, scapular, scapulothoracic and UE control with self care, carrying, lifting, driving, computer work.   [] (94380) Gait Re-education- Provided training and instruction to the patient for proper LE, core and proximal hip recruitment and positioning and eccentric body weight control with ambulation re-education including up and down stairs     Home Management Training / Self Care:  [] (46530) Provided self-care/home management training related to activities of daily living and compensatory training, and/or use of adaptive equipment for improvement with: ADLs and compensatory training, meal preparation, safety procedures and instruction in use of adaptive equipment, including bathing, grooming, dressing, personal hygiene, basic household cleaning and chores.      Home Exercise Program:    [x] (25897) Reviewed/Progressed HEP activities related to strengthening, flexibility, endurance, ROM of   [x] LE / Lumbar: core, proximal hip and LE for functional self-care, mobility, lifting and ambulation/stair navigation   [] UE / Cervical: cervical, postural, scapular, scapulothoracic and UE control with self care, reaching, carrying, lifting, house/yardwork, driving, computer work  [] (37839)Reviewed/Progressed HEP activities related to improving balance, coordination, kinesthetic sense, posture, motor skill, proprioception of   [x] LE: core, proximal hip and LE for self care, mobility, lifting, and ambulation/stair navigation    [] UE / Cervical: cervical, postural,  scapular, scapulothoracic and UE control with self care, reaching, carrying, lifting, house/yardwork, driving, computer work    Manual Treatments:  PROM / STM / Oscillations-Mobs:  G-I, II, III, IV (PA's, Inf., Post.)  [] (83487) Provided manual therapy to mobilize LE, proximal hip and/or LS spine soft tissue/joints for the purpose of modulating pain, promoting relaxation,  increasing ROM, reducing/eliminating soft tissue swelling/inflammation/restriction, improving soft tissue extensibility and allowing for proper ROM for normal function with   [] LE / assess goals progression <30days   [] Goals require adjustment due to lack of progress  [] Patient is not progressing as expected and requires additional follow up with physician  [] Other    Persisting Functional Limitations/Impairments:  [x]Sleeping [x]Sitting               [x]Standing [x]Transfers        [x]Walking [x]Kneeling               [x]Stairs [x]Squatting / bending   [x]ADLs [x]Reaching  [x]Lifting  [x]Housework  []Driving []Job related tasks  [x]Sports/Recreation []Other:        ASSESSMENT:  1/7 pt progressing. Improved ambulation  12/31: Pt tolerated session well this date; decreased overall pain levels from last visit. Pt still unable to determine what causes onset of pain. Treatment/Activity Tolerance:  [x] Patient able to complete tx [] Patient limited by fatigue  [] Patient limited by pain  [] Patient limited by other medical complications  [] Other:     Prognosis: [x] Good [] Fair  [] Poor    Patient Requires Follow-up: [x] Yes  [] No    Plan for next treatment session: stretching of trunk/hips/LE, ROM, posture/body mechs, lumbar stabs, LE/trunk strength, HEP, MOC, currently pt declines aquatics due to fear of water, gait /balance    PLAN: See eval. PT 2x / week for 6 weeks. [x] Continue per plan of care [] Alter current plan (see comments)  [] Plan of care initiated [] Hold pending MD visit [] Discharge    Electronically signed by: Di Santiago PT, DPT    Note: If patient does not return for scheduled/ recommended follow up visits, his note will serve as a discharge from care along with most recent update on progress.

## 2020-01-09 ENCOUNTER — HOSPITAL ENCOUNTER (OUTPATIENT)
Dept: PHYSICAL THERAPY | Age: 85
Setting detail: THERAPIES SERIES
Discharge: HOME | End: 2020-01-09
Payer: MEDICARE

## 2020-01-09 PROCEDURE — 97110 THERAPEUTIC EXERCISES: CPT

## 2020-01-09 PROCEDURE — 97112 NEUROMUSCULAR REEDUCATION: CPT

## 2020-01-09 NOTE — FLOWSHEET NOTE
[facility] - Outpatient Physical Therapy    Physical Therapy Daily Treatment Note  Date:  2020    Patient Name:  Maurisio Heard    :  3/25/1933  MRN: [id number]  Medical/Treatment Diagnosis Information:  · Diagnosis: M47.9 (ICD-10-CM) - Spondylosis and allied disorders  · Treatment Diagnosis: hypomobility L-P spine with myofascial pain B lower quarters, strength/flexibility/ROM deficits at trunk and LE B, decreased use of good body mechs/postures for cleaning/chores  Insurance/Certification information:  PT Insurance Information: medicare/  Physician Information:  Referring Practitioner: Dr. Barb Wright of care signed (Y/N):     Date of Patient follow up with Physician:      Functional scale[de-identified] Oswestry CMS Modifier: CL (12/10/19 1030)  Oswestry Disability Scores %: 62.22 (12/10/19 1030)        Progress Note: []  Yes  [x]  No  Next due by: Visit #10       Latex Allergy:  [x]NO      []YES  Preferred Language for Healthcare:   [x]English       []other:    Visit # Insurance Allowable Date Range (if applicable)    Med nec NA     Pain level: 2/10 back,  1-2/10 Hips,     SUBJECTIVE:  reports increased mid back pain after mopping yesterday and increased hip pain after mopping     states she saw Dr. Juanita Jane yesterday and he said her midback pain is nerve pain and he gave her pain meds for it. Reports her low back pain is doing OK. Reports her son thinks she is walking better than previously. 1/3  Pt stated the rolled towel worked well,  She also stated she   : Pt states she has constant stiffness in low back however that does not bother her much. Reports the pain in her R mid back bothers her the most.  States pain comes and goes and she is unsure if it is getting better with therapy.  reports she has a pain that starts in the R low back, goes up the ribcage and ends up in the R shd. Pt states moving the right leg causes increased R back pain.       Patient Goals   Patient goals : less pain so she can do normal life without pain        Overall Progression Towards Functional goals/ Treatment Progress Update:  [x] Patient is progressing as expected towards functional goals listed. [] Progression is slowed due to complexities/Impairments listed. [] Progression has been slowed due to co-morbidities. [] Plan just implemented, too soon to assess goals progression <30days   [] Goals require adjustment due to lack of progress  [] Patient is not progressing as expected and requires additional follow up with physician  [] Other    Persisting Functional Limitations/Impairments:  [x]Sleeping [x]Sitting               [x]Standing [x]Transfers        [x]Walking [x]Kneeling               [x]Stairs [x]Squatting / bending   [x]ADLs [x]Reaching  [x]Lifting  [x]Housework  []Driving []Job related tasks  [x]Sports/Recreation []Other:        ASSESSMENT:  1/9 needs improved posture /body mechs (to avoid twisting and bending) for ADLs/home chores to reduce strain on back  1/7 pt progressing. Improved ambulation  12/31: Pt tolerated session well this date; decreased overall pain levels from last visit. Pt still unable to determine what causes onset of pain. Treatment/Activity Tolerance:  [x] Patient able to complete tx [] Patient limited by fatigue  [] Patient limited by pain  [] Patient limited by other medical complications  [] Other:     Prognosis: [x] Good [] Fair  [] Poor    Patient Requires Follow-up: [x] Yes  [] No    Plan for next treatment session: stretching of trunk/hips/LE, ROM, posture/body mechs, lumbar stabs, LE/trunk strength, HEP, MOC, currently pt declines aquatics due to fear of water, gait /balance    PLAN: See eval. PT 2x / week for 6 weeks.    [x] Continue per plan of care [] Alter current plan (see comments)  [] Plan of care initiated [] Hold pending MD visit [] Discharge    Electronically signed by: Ale Osorio PT, DPT    Note: If patient does not return for scheduled/ recommended follow up visits, his note will serve as a discharge from care along with most recent update on progress.

## 2020-01-14 ENCOUNTER — HOSPITAL ENCOUNTER (OUTPATIENT)
Dept: PHYSICAL THERAPY | Age: 85
Setting detail: THERAPIES SERIES
Discharge: HOME | End: 2020-01-14
Payer: MEDICARE

## 2020-01-14 PROCEDURE — 97110 THERAPEUTIC EXERCISES: CPT

## 2020-01-14 NOTE — FLOWSHEET NOTE
[facility] - Outpatient Physical Therapy    Physical Therapy Daily Treatment Note  Date:  2020    Patient Name:  Abby Gee    :  3/25/1933  MRN: [id number]  Medical/Treatment Diagnosis Information:  · Diagnosis: M47.9 (ICD-10-CM) - Spondylosis and allied disorders  · Treatment Diagnosis: hypomobility L-P spine with myofascial pain B lower quarters, strength/flexibility/ROM deficits at trunk and LE B, decreased use of good body mechs/postures for cleaning/chores  Insurance/Certification information:  PT Insurance Information: medicare/  Physician Information:  Referring Practitioner: Dr. Pennie Zaman of care signed (Y/N):     Date of Patient follow up with Physician:      Functional scale[de-identified] Oswestry CMS Modifier: CL (12/10/19 1030)  Oswestry Disability Scores %: 62.22 (12/10/19 1030)        Progress Note: []  Yes  [x]  No  Next due by: Visit #10       Latex Allergy:  [x]NO      []YES  Preferred Language for Healthcare:   [x]English       []other:    Visit # Insurance Allowable Date Range (if applicable)    Med nec NA     Pain level: 0/10 back,  0/10 Hips, 0/10 mid back - pt attributes this to her pain pill    SUBJECTIVE:  started a new medicine for nerve pain for her midback. Not sure if it is helping    reports increased mid back pain after mopping yesterday and increased hip pain after mopping     states she saw Dr. Ray Pacheco yesterday and he said her midback pain is nerve pain and he gave her pain meds for it. Reports her low back pain is doing OK. Reports her son thinks she is walking better than previously. 1/3  Pt stated the rolled towel worked well,  She also stated she   : Pt states she has constant stiffness in low back however that does not bother her much. Reports the pain in her R mid back bothers her the most.  States pain comes and goes and she is unsure if it is getting better with therapy.        reports she has a pain that starts in the R low back, goes up the ribcage and ends up in the R shd. Pt states moving the right leg causes increased R back pain. 12/16  Reports she has a pain that starts at the base of her back and goes up to her shoulder blade. States her son drove her and her legs feel shaky. OBJECTIVE:  12/31:  Pt demonstrating fair posture; slight rounded shoulders and slumped posture when sitting   12/19pt with frequent c/o pain t/o tx session. Palpate: tender R UT. Pt denies tender to palpation at LT/MT/rhomboids, levator, post RTC, thoracic-lumbar PS.  scap squeeze and mid rows reproduce pt's pain complaint. 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   per pt \"I have circulation problems - the blood pools in my right leg and a blockage in my left leg that they can't fix\"    Exercises/Interventions:     Therapeutic Exercises (37362) Resistance / level Sets/sec Reps Notes   Nu-step  Or   bike sci fit Nustep 5 min L1   Nu step s=7 arms = 11  sci fit nu step s= 16.  Arms = 6   IB/HR/TR 2x30\"/1x20/1x20      Steps      Step ups                       HS stretches 2x30\"   Hip flex stretch 2x30\"    4\" frd step ups: 15x ea R/L                 12/19 stopped step ups due to R hip pain   Mat ex   Min cues   SB supine DKTC 10 x5\"   orange ball            cables Mid rows x 2 plates    3 way kicks at cables with 1. pl  x 12 each 2      1 10 ea      12 ea       B                 Therapeutic Activities (99500)                                          Neuromuscular Re-ed (06028)              Body mechanics for home chores Review with pt how to vacuum, mop, sweep with good posture and body mechanics  Instructed and practiced/demo how to lift items from floor with squat and with golfer's lift    1/9 issued handout for HEP                               Manual Intervention (55499)                                                     Manual: 12/31:  None this date    12/12 trial IASTM B lumbar PS, glut max/med/piriformis in R s/l    Pt. Education: 12/31:  Educated on posture correction, sitting with lumbar towel roll and frequent stretching. -12/12 review HEP    -all patient questions were answered    HEP instruction: 1/9 handout of good posture and body mechs for ADLs/home chores issued for HEP   1/3/2020  Added piriformis stretch to H EP  12/31:  Instructed on using lumbar towel roll when sitting at home and in car  -12/10 TrA iso, Bridge, SKTC, HS stretch- supine with towel  patient provided with written and illustrated instructions for HEP     Therapeutic Exercise and NMR EXR  [x] (78396) Provided verbal/tactile cueing for activities related to strengthening, flexibility, endurance, ROM for improvements in  [x] LE / Lumbar: LE, proximal hip, and core control with self care, mobility, lifting, ambulation. [] UE / Cervical: cervical, postural, scapular, scapulothoracic and UE control with self care, reaching, carrying, lifting, house/yardwork, driving, computer work. [x] (57467) Provided verbal/tactile cueing for activities related to improving balance, coordination, kinesthetic sense, posture, motor skill, proprioception to assist with   [x] LE / lumbar: LE, proximal hip, and core control in self care, mobility, lifting, ambulation and eccentric single leg control. [] UE / cervical: cervical, scapular, scapulothoracic and UE control with self care, reaching, carrying, lifting, house/yardwork, driving, computer work.   [] (63582) Therapist is in constant attendance of 2 or more patients providing skilled therapy interventions, but not providing any significant amount of measurable one-on-one time to either patient, for improvements in  [] LE / lumbar: LE, proximal hip, and core control in self care, mobility, lifting, ambulation and eccentric single leg control.    [] UE / cervical: cervical, scapular, scapulothoracic and UE control with self care, reaching, carrying, lifting, house/yardwork, driving, computer work. NMR and Therapeutic Activities:    [] (75196 or 12100) Provided verbal/tactile cueing for activities related to improving balance, coordination, kinesthetic sense, posture, motor skill, proprioception and motor activation to allow for proper function of   [] LE: / Lumbar core, proximal hip and LE with self care and ADLs  [] UE / Cervical: cervical, postural, scapular, scapulothoracic and UE control with self care, carrying, lifting, driving, computer work.   [] (73798) Gait Re-education- Provided training and instruction to the patient for proper LE, core and proximal hip recruitment and positioning and eccentric body weight control with ambulation re-education including up and down stairs     Home Management Training / Self Care:  [] (16666) Provided self-care/home management training related to activities of daily living and compensatory training, and/or use of adaptive equipment for improvement with: ADLs and compensatory training, meal preparation, safety procedures and instruction in use of adaptive equipment, including bathing, grooming, dressing, personal hygiene, basic household cleaning and chores.      Home Exercise Program:    [x] (50936) Reviewed/Progressed HEP activities related to strengthening, flexibility, endurance, ROM of   [x] LE / Lumbar: core, proximal hip and LE for functional self-care, mobility, lifting and ambulation/stair navigation   [] UE / Cervical: cervical, postural, scapular, scapulothoracic and UE control with self care, reaching, carrying, lifting, house/yardwork, driving, computer work  [] (84128)Reviewed/Progressed HEP activities related to improving balance, coordination, kinesthetic sense, posture, motor skill, proprioception of   [x] LE: core, proximal hip and LE for self care, mobility, lifting, and ambulation/stair navigation    [] UE / Cervical: cervical, postural,  scapular, scapulothoracic and UE control with self care, reaching, carrying, lifting, house/yardwork, driving, computer work    Manual Treatments:  PROM / STM / Oscillations-Mobs:  G-I, II, III, IV (PA's, Inf., Post.)  [] (60536) Provided manual therapy to mobilize LE, proximal hip and/or LS spine soft tissue/joints for the purpose of modulating pain, promoting relaxation,  increasing ROM, reducing/eliminating soft tissue swelling/inflammation/restriction, improving soft tissue extensibility and allowing for proper ROM for normal function with   [] LE / lumbar: self care, mobility, lifting and ambulation. [] UE / Cervical: self care, reaching, carrying, lifting, house/yardwork, driving, computer work. Modalities:  [] (92581) Vasopneumatic compression: Utilized vasopneumatic compression to decrease edema / swelling for the purpose of improving mobility and quad tone / recruitment which will allow for increased overall function including but not limited to self-care, transfers, ambulation, and ascending / descending stairs. Modalities:  12/31:  Pt declined this date  2/12  MHP x10' to lumbar/hip region  In R  S/l    Charges:  Timed Code Treatment Minutes: 32   Total Treatment Minutes: 32     [] EVAL - LOW (70730)   [] EVAL - MOD (92686)  [] EVAL - HIGH (94441)  [] RE-EVAL (28942)  [x] TE (06305) x   2  [] Ionto  [] NMR (53166) x  1    [] Vaso  [] Manual (96672) x      [] Ultrasound  [] TA x   2    [] Mech Traction (32051)  [] Gait Training x     [] ES (un) (09153):   [] Aquatic therapy x   [] Other:   [] Group:     GOALS:   Long term goals  Time Frame for Long term goals : 6 wks  Long term goal 1: increase activity fauzia so that pt can clean her mobile home independently  Long term goal 2: increase B LE and trunk strength so that pt can resume walking for fitness in mobile home park and so that she can resume doing her shopping without increased LBP/hip pain  Long term goal 3: reduce pain so that pt can sleep thru night without waking due to pain. Long term goal 4: increase painfree AROM and flexibility so that pt able to use good body mehcs for ADLs/IADLs. Patient Goals   Patient goals : less pain so she can do normal life without pain        Overall Progression Towards Functional goals/ Treatment Progress Update:  [x] Patient is progressing as expected towards functional goals listed. [] Progression is slowed due to complexities/Impairments listed. [] Progression has been slowed due to co-morbidities. [] Plan just implemented, too soon to assess goals progression <30days   [] Goals require adjustment due to lack of progress  [] Patient is not progressing as expected and requires additional follow up with physician  [] Other    Persisting Functional Limitations/Impairments:  [x]Sleeping [x]Sitting               [x]Standing [x]Transfers        [x]Walking [x]Kneeling               [x]Stairs [x]Squatting / bending   [x]ADLs [x]Reaching  [x]Lifting  [x]Housework  []Driving []Job related tasks  [x]Sports/Recreation []Other:        ASSESSMENT:  1/9 needs improved posture /body mechs (to avoid twisting and bending) for ADLs/home chores to reduce strain on back  1/7 pt progressing. Improved ambulation  12/31: Pt tolerated session well this date; decreased overall pain levels from last visit. Pt still unable to determine what causes onset of pain. Treatment/Activity Tolerance:  [x] Patient able to complete tx [] Patient limited by fatigue  [] Patient limited by pain  [] Patient limited by other medical complications  [] Other:     Prognosis: [x] Good [] Fair  [] Poor    Patient Requires Follow-up: [x] Yes  [] No    Plan for next treatment session: stretching of trunk/hips/LE, ROM, posture/body mechs, lumbar stabs, LE/trunk strength, HEP, MOC, currently pt declines aquatics due to fear of water, gait /balance    PLAN: See eval. PT 2x / week for 6 weeks.    [x] Continue per plan of care [] Alter current plan (see comments)  [] Plan of care initiated [] Hold

## 2020-01-16 ENCOUNTER — HOSPITAL ENCOUNTER (OUTPATIENT)
Dept: PHYSICAL THERAPY | Age: 85
Setting detail: THERAPIES SERIES
Discharge: HOME | End: 2020-01-16
Payer: MEDICARE

## 2020-01-16 PROCEDURE — 97530 THERAPEUTIC ACTIVITIES: CPT

## 2020-01-16 PROCEDURE — 97110 THERAPEUTIC EXERCISES: CPT

## 2020-01-16 NOTE — FLOWSHEET NOTE
R L  SLS 2sec 2sec     12/31:  Pt demonstrating fair posture; slight rounded shoulders and slumped posture when sitting   12/19pt with frequent c/o pain t/o tx session. Palpate: tender R UT. Pt denies tender to palpation at LT/MT/rhomboids, levator, post RTC, thoracic-lumbar PS.  scap squeeze and mid rows reproduce pt's pain complaint. 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   per pt \"I have circulation problems - the blood pools in my right leg and a blockage in my left leg that they can't fix\"    Exercises/Interventions:     Therapeutic Exercises (57167) Resistance / level Sets/sec Reps Notes   Nu-step  Or   bike sci fit Nustep 5 min L1   Nu step s=7 arms = 11  sci fit nu step s= 16.  Arms = 6   IB/HR/TR 2x30\"/1x20/1x20      Steps      Step ups                       HS stretches 2x30\"   Hip flex stretch 2x30\"    4\" frd step ups: 15x ea R/L                 12/19 stopped step ups due to R hip pain   Mat ex   Min cues   SB supine    orange ball    1/16 deferred due to time constraints          cables NEXT SESSION:  Try multifidi  walkouts AS FAUZIA -cuesmaintain neutral spine due to chronic back pain    Mid rows x 2 plates    3 way kicks at cables with 1. pl  x 10 each except 20x for hip ext             2      1             10 ea      12 ea             1/16 deferred due to time constraints                 Therapeutic Activities (49738)                                          Neuromuscular Re-ed (31160)              Body mechanics for home chores Encouraged pt to cont with good posture and body mechs for all home chores and ADLs/IADLs    t    1/9 issued handout for HEP   Balance activities SLS: 3x15\" R/L  NEXT SESSION: progress balance ex as fauzia   Fingertips for assist                        Manual Intervention (87346)                                                     Manual: 12/31:  None this date    12/12 trial IASTM B lumbar PS, glut cervical: cervical, scapular, scapulothoracic and UE control with self care, reaching, carrying, lifting, house/yardwork, driving, computer work. NMR and Therapeutic Activities:    [] (95027 or 43738) Provided verbal/tactile cueing for activities related to improving balance, coordination, kinesthetic sense, posture, motor skill, proprioception and motor activation to allow for proper function of   [] LE: / Lumbar core, proximal hip and LE with self care and ADLs  [] UE / Cervical: cervical, postural, scapular, scapulothoracic and UE control with self care, carrying, lifting, driving, computer work.   [] (65491) Gait Re-education- Provided training and instruction to the patient for proper LE, core and proximal hip recruitment and positioning and eccentric body weight control with ambulation re-education including up and down stairs     Home Management Training / Self Care:  [] (11809) Provided self-care/home management training related to activities of daily living and compensatory training, and/or use of adaptive equipment for improvement with: ADLs and compensatory training, meal preparation, safety procedures and instruction in use of adaptive equipment, including bathing, grooming, dressing, personal hygiene, basic household cleaning and chores.      Home Exercise Program:    [x] (43081) Reviewed/Progressed HEP activities related to strengthening, flexibility, endurance, ROM of   [x] LE / Lumbar: core, proximal hip and LE for functional self-care, mobility, lifting and ambulation/stair navigation   [] UE / Cervical: cervical, postural, scapular, scapulothoracic and UE control with self care, reaching, carrying, lifting, house/yardwork, driving, computer work  [] (92601)Reviewed/Progressed HEP activities related to improving balance, coordination, kinesthetic sense, posture, motor skill, proprioception of   [x] LE: core, proximal hip and LE for self care, mobility, lifting, and ambulation/stair navigation [] UE / Cervical: cervical, postural,  scapular, scapulothoracic and UE control with self care, reaching, carrying, lifting, house/yardwork, driving, computer work    Manual Treatments:  PROM / STM / Oscillations-Mobs:  G-I, II, III, IV (PA's, Inf., Post.)  [] (20134) Provided manual therapy to mobilize LE, proximal hip and/or LS spine soft tissue/joints for the purpose of modulating pain, promoting relaxation,  increasing ROM, reducing/eliminating soft tissue swelling/inflammation/restriction, improving soft tissue extensibility and allowing for proper ROM for normal function with   [] LE / lumbar: self care, mobility, lifting and ambulation. [] UE / Cervical: self care, reaching, carrying, lifting, house/yardwork, driving, computer work. Modalities:  [] (52517) Vasopneumatic compression: Utilized vasopneumatic compression to decrease edema / swelling for the purpose of improving mobility and quad tone / recruitment which will allow for increased overall function including but not limited to self-care, transfers, ambulation, and ascending / descending stairs.        Modalities:  12/31:  Pt declined this date  2/12  MHP x10' to lumbar/hip region  In R  S/l    Charges:  Timed Code Treatment Minutes: 30   Total Treatment Minutes: 30     [] EVAL - LOW (68645)   [] EVAL - MOD (79183)  [] EVAL - HIGH (95649)  [] RE-EVAL (27913)  [x] TE (19431) x   1  [] Ionto  [] NMR (03396) x  1    [] Vaso  [] Manual (34906) x      [] Ultrasound  [x] TA x   1    [] Mech Traction (29445)  [] Gait Training x     [] ES (un) (23072):   [] Aquatic therapy x   [] Other:   [] Group:     GOALS:   Long term goals  Time Frame for Long term goals : 6 wks  Long term goal 1: increase activity fauzia so that pt can clean her mobile home independently GOAL MET  Long term goal 2: increase B LE and trunk strength so that pt can resume walking for fitness in mobile home park and so that she can resume doing her shopping without increased LBP/hip pain PROGRESSING WELL  Long term goal 3: reduce pain so that pt can sleep thru night without waking due to pain. PROGRESSING  Long term goal 4: increase painfree AROM and flexibility so that pt able to use good body mechs for ADLs/IADLs. PROGRESSING  Patient Goals   Patient goals : less pain so she can do normal life without pain        Overall Progression Towards Functional goals/ Treatment Progress Update:  [x] Patient is progressing as expected towards functional goals listed. [] Progression is slowed due to complexities/Impairments listed. [] Progression has been slowed due to co-morbidities. [] Plan just implemented, too soon to assess goals progression <30days   [] Goals require adjustment due to lack of progress  [] Patient is not progressing as expected and requires additional follow up with physician  [] Other    Persisting Functional Limitations/Impairments:  [x]Sleeping [x]Sitting               [x]Standing [x]Transfers        [x]Walking [x]Kneeling               [x]Stairs [x]Squatting / bending   [x]ADLs [x]Reaching  [x]Lifting  [x]Housework  []Driving []Job related tasks  [x]Sports/Recreation []Other:        ASSESSMENT:  1/16 progressing well. pt demos much improved posture and body mechs for ADLs/ home chores. Increasing strengths & flexibities. She cont with balance deficits and strength deficits B hips and L LE is weaker than R LE t/o. Pt will benefit from cont with PT POC and HEP to achieve PLOF.    1/9 needs improved posture /body mechs (to avoid twisting and bending) for ADLs/home chores to reduce strain on back  1/7 pt progressing. Improved ambulation  12/31: Pt tolerated session well this date; decreased overall pain levels from last visit. Pt still unable to determine what causes onset of pain.       Treatment/Activity Tolerance:  [x] Patient able to complete tx [] Patient limited by fatigue  [] Patient limited by pain  [] Patient limited by other medical complications  [] Other:

## 2020-01-21 ENCOUNTER — HOSPITAL ENCOUNTER (OUTPATIENT)
Dept: PHYSICAL THERAPY | Age: 85
Setting detail: THERAPIES SERIES
Discharge: HOME | End: 2020-01-21
Payer: MEDICARE

## 2020-01-21 PROCEDURE — 97110 THERAPEUTIC EXERCISES: CPT

## 2020-01-21 PROCEDURE — 97112 NEUROMUSCULAR REEDUCATION: CPT

## 2020-01-21 NOTE — FLOWSHEET NOTE
[facility] - Outpatient Physical Therapy    Physical Therapy Daily Treatment Note  Date:  2020    Patient Name:  Jose Espinosa    :  3/25/1933  MRN: [id number]  Medical/Treatment Diagnosis Information:  · Diagnosis: M47.9 (ICD-10-CM) - Spondylosis and allied disorders  · Treatment Diagnosis: hypomobility L-P spine with myofascial pain B lower quarters, strength/flexibility/ROM deficits at trunk and LE B, decreased use of good body mechs/postures for cleaning/chores  Insurance/Certification information:  PT Insurance Information: medicare/  Physician Information:  Referring Practitioner: Dr. Celestino Chappell of care signed (Y/N):     Date of Patient follow up with Physician:      Functional scale[de-identified] Oswestry CMS Modifier: CL (12/10/19 1030)  Oswestry Disability Scores %: 62.22 (12/10/19 1030)        Progress Note: []  Yes  [x]  No  Next due by: Visit #10       Latex Allergy:  [x]NO      []YES  Preferred Language for Healthcare:   [x]English       []other:    Visit # Insurance Allowable Date Range (if applicable)    Med nec NA     Pain level: 0/10 back,  0/10 Hips, 0/10 mid back    SUBJECTIVE:   Same as last visit, still ready to be done after next couple visits. No pain in back, have some pain in upper/mid back, but taking nerve pills for that.  reports her shd and midback pain is getting better. Thinks it is bc of the new pill for her nerve pain. Reports she hasn't had to antonia a pain pill since . Reports the pain in her low back is gone and she can clean normally at home     started a new medicine for nerve pain for her midback. Not sure if it is helping    reports increased mid back pain after mopping yesterday and increased hip pain after mopping     states she saw Dr. Cristi Prasad yesterday and he said her midback pain is nerve pain and he gave her pain meds for it. Reports her low back pain is doing OK.  Reports her son thinks she is walking better than previously. 1/3  Pt stated the rolled towel worked well,  She also stated she   12/31: Pt states she has constant stiffness in low back however that does not bother her much. Reports the pain in her R mid back bothers her the most.  States pain comes and goes and she is unsure if it is getting better with therapy. 12/19 reports she has a pain that starts in the R low back, goes up the ribcage and ends up in the R shd. Pt states moving the right leg causes increased R back pain. 12/16  Reports she has a pain that starts at the base of her back and goes up to her shoulder blade. States her son drove her and her legs feel shaky. OBJECTIVE:   1/16  flexibilities: B HS and hip flex: improving  Strength:   R L  Hip flex   5/5 4+/5  Hip IR  4/5 4/5  Hip ER  4/5 4/5  Quad  5/5   HS  DF    Balance:  R L  SLS 2sec 2sec     12/31:  Pt demonstrating fair posture; slight rounded shoulders and slumped posture when sitting   12/19pt with frequent c/o pain t/o tx session. Palpate: tender R UT. Pt denies tender to palpation at LT/MT/rhomboids, levator, post RTC, thoracic-lumbar PS.  scap squeeze and mid rows reproduce pt's pain complaint. 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   per pt \"I have circulation problems - the blood pools in my right leg and a blockage in my left leg that they can't fix\"    Exercises/Interventions:     Therapeutic Exercises (17897) Resistance / level Sets/sec Reps Notes   Nu-step  Or   bike sci fit Nustep 5 min L1   Nu step s=7 arms = 11  sci fit nu step s= 16.  Arms = 6   IB/HR/TR 2x30\"/1x20/1x20      Steps      Step ups                       HS stretches 2x30\"   Hip flex stretch 2x30\"    6\" frd step ups: 15x ea R/L                 12/19 stopped step ups due to R hip pain   Mat ex   Min cues   SB supine    orange ball    1/16 deferred due to time constraints          cables Multifidi Walkouts 1.5 plates    Mid rows x 2.5 plates  High Rows 2.5 plates                       2  2      1             X 5 B      10 ea  10 ea      12 ea                   1/16 deferred due to time constraints                 Therapeutic Activities (36180)                                          Neuromuscular Re-ed (01241)              Body mechanics for home chores Encouraged pt to cont with good posture and body mechs for all home chores and ADLs/IADLs    t    1/9 issued handout for HEP   Balance activities SLS: 3x15\" R/L  NEXT SESSION: progress balance ex as fauzia   Fingertips for assist   Tandem balance Airex  30\"  x 2     SLS Airex  30\"  X 2  occ UE support    Alt cone taps, orange   X 15 each No UE support, good weight shifting, no LOB    Step-Up to SLS   X 10 B                  Manual Intervention (08600)                                                     Manual: 12/31:  None this date    12/12 trial IASTM B lumbar PS, glut max/med/piriformis in R s/l    Pt. Education: 1/16 re-assess for pN. Review with pt re progress toward goals and PT POC. Instructed in advanced HEP - see below  12/31:  Educated on posture correction, sitting with lumbar towel roll and frequent stretching. -12/12 review HEP    -all patient questions were answered    HEP instruction:   1/16 SLS R/L  1/9 handout of good posture and body mechs for ADLs/home chores issued for HEP   1/3/2020  Added piriformis stretch to H EP  12/31:  Instructed on using lumbar towel roll when sitting at home and in car  -12/10 TrA iso, Bridge, SKTC, HS stretch- supine with towel  patient provided with written and illustrated instructions for HEP     Therapeutic Exercise and NMR EXR  [x] (63625) Provided verbal/tactile cueing for activities related to strengthening, flexibility, endurance, ROM for improvements in  [x] LE / Lumbar: LE, proximal hip, and core control with self care, mobility, lifting, ambulation.   [] UE / Cervical: cervical, postural, scapular, training, and/or use of adaptive equipment for improvement with: ADLs and compensatory training, meal preparation, safety procedures and instruction in use of adaptive equipment, including bathing, grooming, dressing, personal hygiene, basic household cleaning and chores. Home Exercise Program:    [x] (79294) Reviewed/Progressed HEP activities related to strengthening, flexibility, endurance, ROM of   [x] LE / Lumbar: core, proximal hip and LE for functional self-care, mobility, lifting and ambulation/stair navigation   [] UE / Cervical: cervical, postural, scapular, scapulothoracic and UE control with self care, reaching, carrying, lifting, house/yardwork, driving, computer work  [] (22778)Reviewed/Progressed HEP activities related to improving balance, coordination, kinesthetic sense, posture, motor skill, proprioception of   [x] LE: core, proximal hip and LE for self care, mobility, lifting, and ambulation/stair navigation    [] UE / Cervical: cervical, postural,  scapular, scapulothoracic and UE control with self care, reaching, carrying, lifting, house/yardwork, driving, computer work    Manual Treatments:  PROM / STM / Oscillations-Mobs:  G-I, II, III, IV (PA's, Inf., Post.)  [] (67322) Provided manual therapy to mobilize LE, proximal hip and/or LS spine soft tissue/joints for the purpose of modulating pain, promoting relaxation,  increasing ROM, reducing/eliminating soft tissue swelling/inflammation/restriction, improving soft tissue extensibility and allowing for proper ROM for normal function with   [] LE / lumbar: self care, mobility, lifting and ambulation. [] UE / Cervical: self care, reaching, carrying, lifting, house/yardwork, driving, computer work.      Modalities:  [] (86717) Vasopneumatic compression: Utilized vasopneumatic compression to decrease edema / swelling for the purpose of improving mobility and quad tone / recruitment which will allow for increased overall function including but not limited to self-care, transfers, ambulation, and ascending / descending stairs. Modalities:  12/31:  Pt declined this date  2/12  MHP x10' to lumbar/hip region  In R  S/l    Charges:  Timed Code Treatment Minutes: 30   Total Treatment Minutes: 30     [] EVAL - LOW (68370)   [] EVAL - MOD (86998)  [] EVAL - HIGH (19816)  [] RE-EVAL (89716)  [x] TE (39453) x   1  [] Ionto  [x] NMR (06252) x  1    [] Vaso  [] Manual (11951) x      [] Ultrasound  [] TA x   1    [] Mech Traction (08214)  [] Gait Training x     [] ES (un) (45765):   [] Aquatic therapy x   [] Other:   [] Group:     GOALS:   Long term goals  Time Frame for Long term goals : 6 wks  Long term goal 1: increase activity fauzia so that pt can clean her mobile home independently GOAL MET  Long term goal 2: increase B LE and trunk strength so that pt can resume walking for fitness in mobile home park and so that she can resume doing her shopping without increased LBP/hip pain PROGRESSING WELL  Long term goal 3: reduce pain so that pt can sleep thru night without waking due to pain. PROGRESSING  Long term goal 4: increase painfree AROM and flexibility so that pt able to use good body mechs for ADLs/IADLs. PROGRESSING  Patient Goals   Patient goals : less pain so she can do normal life without pain        Overall Progression Towards Functional goals/ Treatment Progress Update:  [x] Patient is progressing as expected towards functional goals listed. [] Progression is slowed due to complexities/Impairments listed. [] Progression has been slowed due to co-morbidities.   [] Plan just implemented, too soon to assess goals progression <30days   [] Goals require adjustment due to lack of progress  [] Patient is not progressing as expected and requires additional follow up with physician  [] Other    Persisting Functional

## 2020-01-23 ENCOUNTER — HOSPITAL ENCOUNTER (OUTPATIENT)
Dept: PHYSICAL THERAPY | Age: 85
Setting detail: THERAPIES SERIES
Discharge: HOME | End: 2020-01-23
Payer: MEDICARE

## 2020-01-23 PROCEDURE — 97110 THERAPEUTIC EXERCISES: CPT

## 2020-01-23 PROCEDURE — 97112 NEUROMUSCULAR REEDUCATION: CPT

## 2020-01-23 NOTE — FLOWSHEET NOTE
[facility] - Outpatient Physical Therapy    Physical Therapy Daily Treatment Note  Date:  2020    Patient Name:  Roxane Lefort    :  3/25/1933  MRN: [id number]  Medical/Treatment Diagnosis Information:  · Diagnosis: M47.9 (ICD-10-CM) - Spondylosis and allied disorders  · Treatment Diagnosis: hypomobility L-P spine with myofascial pain B lower quarters, strength/flexibility/ROM deficits at trunk and LE B, decreased use of good body mechs/postures for cleaning/chores  Insurance/Certification information:  PT Insurance Information: medicare/  Physician Information:  Referring Practitioner: Dr. Zohaib Beal of care signed (Y/N):     Date of Patient follow up with Physician:      Functional scale[de-identified] Oswestry CMS Modifier: CL (12/10/19 1030)  Oswestry Disability Scores %: 62.22 (12/10/19 1030)        Progress Note: []  Yes  [x]  No  Next due by: Visit #10       Latex Allergy:  [x]NO      []YES  Preferred Language for Healthcare:   [x]English       []other:    Visit # Insurance Allowable Date Range (if applicable)    Med nec NA     Pain level: 0/10 back,  0/10 Hips, 0/10 mid back    SUBJECTIVE:   no back pain. Balance and walking doing well. Doing her HEP   Same as last visit, still ready to be done after next couple visits. No pain in back, have some pain in upper/mid back, but taking nerve pills for that.  reports her shd and midback pain is getting better. Thinks it is bc of the new pill for her nerve pain. Reports she hasn't had to antonia a pain pill since . Reports the pain in her low back is gone and she can clean normally at home     started a new medicine for nerve pain for her midback. Not sure if it is helping    reports increased mid back pain after mopping yesterday and increased hip pain after mopping     states she saw Dr. Mallorie Tolbert yesterday and he said her midback pain is nerve pain and he gave her pain meds for it.  Reports her low back pain is doing OK. Reports her son thinks she is walking better than previously. 1/3  Pt stated the rolled towel worked well,  She also stated she   12/31: Pt states she has constant stiffness in low back however that does not bother her much. Reports the pain in her R mid back bothers her the most.  States pain comes and goes and she is unsure if it is getting better with therapy. 12/19 reports she has a pain that starts in the R low back, goes up the ribcage and ends up in the R shd. Pt states moving the right leg causes increased R back pain. 12/16  Reports she has a pain that starts at the base of her back and goes up to her shoulder blade. States her son drove her and her legs feel shaky. OBJECTIVE:   1/16  flexibilities: B HS and hip flex: improving  Strength:   R L  Hip flex   5/5 4+/5  Hip IR  4/5 4/5  Hip ER  4/5 4/5  Quad  5/5   HS  DF    Balance:  R L  SLS 2sec 2sec     12/31:  Pt demonstrating fair posture; slight rounded shoulders and slumped posture when sitting   12/19pt with frequent c/o pain t/o tx session. Palpate: tender R UT. Pt denies tender to palpation at LT/MT/rhomboids, levator, post RTC, thoracic-lumbar PS.  scap squeeze and mid rows reproduce pt's pain complaint. 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   per pt \"I have circulation problems - the blood pools in my right leg and a blockage in my left leg that they can't fix\"    Exercises/Interventions:     Therapeutic Exercises (50813) Resistance / level Sets/sec Reps Notes   Nu-step  Or   bike sci fit Nustep 5 min L1   Nu step s=7 arms = 11  sci fit nu step s= 16.  Arms = 6   IB/HR/TR 2x30\"/1x20/1x20      Steps      Step ups                       HS stretches 1x30\"                        12/19 stopped step ups due to R hip pain   Mat ex   Min cues   SB supine    orange ball    1/16 deferred due to time constraints          cables Multifidi Walkouts 1.5 plates    Mid rows x 2.5 plates  High Rows 2.5 plates                       2  2      1             X 6 B      10 ea  10 ea      12 ea                   1/16 deferred due to time constraints                 Therapeutic Activities (72720)                                          Neuromuscular Re-ed (97730)              Body mechanics for home chores Encouraged pt to cont with good posture and body mechs for all home chores and ADLs/IADLs    t    1/9 issued handout for HEP   Balance activities SLS: 3x15\" R/L  NEXT SESSION: progress balance ex as fauzia   Fingertips for assist   Tandem balance Airex  30\"  x 2     SLS Airex  30\"  X 2  occ UE support    Alt cone taps, orange   X 15 each No UE support, good weight shifting, no LOB    BOSU Step-Up to SLS   X 10 B                  Manual Intervention (14753)                                                     Manual: 12/31:  None this date    12/12 trial IASTM B lumbar PS, glut max/med/piriformis in R s/l    Pt. Education: 1/16 re-assess for pN. Review with pt re progress toward goals and PT POC. Instructed in advanced HEP - see below  12/31:  Educated on posture correction, sitting with lumbar towel roll and frequent stretching. -12/12 review HEP    -all patient questions were answered    HEP instruction:   1/16 SLS R/L  1/9 handout of good posture and body mechs for ADLs/home chores issued for HEP   1/3/2020  Added piriformis stretch to H EP  12/31:  Instructed on using lumbar towel roll when sitting at home and in car  -12/10 TrA iso, Bridge, SKTC, HS stretch- supine with towel  patient provided with written and illustrated instructions for HEP     Therapeutic Exercise and NMR EXR  [x] (18129) Provided verbal/tactile cueing for activities related to strengthening, flexibility, endurance, ROM for improvements in  [x] LE / Lumbar: LE, proximal hip, and core control with self care, mobility, lifting, ambulation.   [] UE / Cervical: cervical, postural, scapular, scapulothoracic and UE control with self care, reaching, carrying, lifting, house/yardwork, driving, computer work. [x] (15006) Provided verbal/tactile cueing for activities related to improving balance, coordination, kinesthetic sense, posture, motor skill, proprioception to assist with   [x] LE / lumbar: LE, proximal hip, and core control in self care, mobility, lifting, ambulation and eccentric single leg control. [] UE / cervical: cervical, scapular, scapulothoracic and UE control with self care, reaching, carrying, lifting, house/yardwork, driving, computer work.   [] (23728) Therapist is in constant attendance of 2 or more patients providing skilled therapy interventions, but not providing any significant amount of measurable one-on-one time to either patient, for improvements in  [] LE / lumbar: LE, proximal hip, and core control in self care, mobility, lifting, ambulation and eccentric single leg control. [] UE / cervical: cervical, scapular, scapulothoracic and UE control with self care, reaching, carrying, lifting, house/yardwork, driving, computer work.      NMR and Therapeutic Activities:    [] (08171 or 76095) Provided verbal/tactile cueing for activities related to improving balance, coordination, kinesthetic sense, posture, motor skill, proprioception and motor activation to allow for proper function of   [] LE: / Lumbar core, proximal hip and LE with self care and ADLs  [] UE / Cervical: cervical, postural, scapular, scapulothoracic and UE control with self care, carrying, lifting, driving, computer work.   [] (11022) Gait Re-education- Provided training and instruction to the patient for proper LE, core and proximal hip recruitment and positioning and eccentric body weight control with ambulation re-education including up and down stairs     Home Management Training / Self Care:  [] (43544) Provided self-care/home management training related to activities of daily living and compensatory training, and/or use of adaptive equipment for improvement with: ADLs and compensatory training, meal preparation, safety procedures and instruction in use of adaptive equipment, including bathing, grooming, dressing, personal hygiene, basic household cleaning and chores. Home Exercise Program:    [x] (56579) Reviewed/Progressed HEP activities related to strengthening, flexibility, endurance, ROM of   [x] LE / Lumbar: core, proximal hip and LE for functional self-care, mobility, lifting and ambulation/stair navigation   [] UE / Cervical: cervical, postural, scapular, scapulothoracic and UE control with self care, reaching, carrying, lifting, house/yardwork, driving, computer work  [] (30625)Reviewed/Progressed HEP activities related to improving balance, coordination, kinesthetic sense, posture, motor skill, proprioception of   [x] LE: core, proximal hip and LE for self care, mobility, lifting, and ambulation/stair navigation    [] UE / Cervical: cervical, postural,  scapular, scapulothoracic and UE control with self care, reaching, carrying, lifting, house/yardwork, driving, computer work    Manual Treatments:  PROM / STM / Oscillations-Mobs:  G-I, II, III, IV (PA's, Inf., Post.)  [] (03318) Provided manual therapy to mobilize LE, proximal hip and/or LS spine soft tissue/joints for the purpose of modulating pain, promoting relaxation,  increasing ROM, reducing/eliminating soft tissue swelling/inflammation/restriction, improving soft tissue extensibility and allowing for proper ROM for normal function with   [] LE / lumbar: self care, mobility, lifting and ambulation. [] UE / Cervical: self care, reaching, carrying, lifting, house/yardwork, driving, computer work.      Modalities:  [] (90134) Vasopneumatic compression: Utilized vasopneumatic compression to decrease edema / swelling for the purpose of improving mobility and quad tone / recruitment which will allow for increased overall function including but not limited to self-care, transfers, ambulation, and ascending / descending stairs. Modalities:  12/31:  Pt declined this date  2/12  MHP x10' to lumbar/hip region  In R  S/l    Charges:  Timed Code Treatment Minutes: 31   Total Treatment Minutes: 31     [] EVAL - LOW (68691)   [] EVAL - MOD (28337)  [] EVAL - HIGH (92752)  [] RE-EVAL (13462)  [x] TE (36596) x   1  [] Ionto  [x] NMR (59608) x  1    [] Vaso  [] Manual (13287) x      [] Ultrasound  [] TA x   1    [] Mech Traction (72467)  [] Gait Training x     [] ES (un) (56144):   [] Aquatic therapy x   [] Other:   [] Group:     GOALS:   Long term goals  Time Frame for Long term goals : 6 wks  Long term goal 1: increase activity fauzia so that pt can clean her mobile home independently GOAL MET  Long term goal 2: increase B LE and trunk strength so that pt can resume walking for fitness in mobile home park and so that she can resume doing her shopping without increased LBP/hip pain PROGRESSING WELL  Long term goal 3: reduce pain so that pt can sleep thru night without waking due to pain. PROGRESSING  Long term goal 4: increase painfree AROM and flexibility so that pt able to use good body mechs for ADLs/IADLs. PROGRESSING  Patient Goals   Patient goals : less pain so she can do normal life without pain        Overall Progression Towards Functional goals/ Treatment Progress Update:  [x] Patient is progressing as expected towards functional goals listed. [] Progression is slowed due to complexities/Impairments listed. [] Progression has been slowed due to co-morbidities.   [] Plan just implemented, too soon to assess goals progression <30days   [] Goals require adjustment due to lack of progress  [] Patient is not progressing as expected and requires additional follow up with physician  [] Other    Persisting Functional Limitations/Impairments:  [x]Sleeping [x]Sitting               [x]Standing [x]Transfers        [x]Walking [x]Kneeling               [x]Stairs [x]Squatting / bending   [x]ADLs [x]Reaching  [x]Lifting  [x]Housework  []Driving []Job related tasks  [x]Sports/Recreation []Other:        ASSESSMENT:  1/21. Tolerated new progressions well, demonstrated no LOB or instability, with no increased c/o pain. Should be ready for HEP progression and D/C next visit  1/16 progressing well. pt demos much improved posture and body mechs for ADLs/ home chores. Increasing strengths & flexibities. She cont with balance deficits and strength deficits B hips and L LE is weaker than R LE t/o. Pt will benefit from cont with PT POC and HEP to achieve PLOF.    1/9 needs improved posture /body mechs (to avoid twisting and bending) for ADLs/home chores to reduce strain on back  1/7 pt progressing. Improved ambulation  12/31: Pt tolerated session well this date; decreased overall pain levels from last visit. Pt still unable to determine what causes onset of pain. Treatment/Activity Tolerance:  [x] Patient able to complete tx [] Patient limited by fatigue  [] Patient limited by pain  [] Patient limited by other medical complications  [] Other:     Prognosis: [x] Good [] Fair  [] Poor    Patient Requires Follow-up: [x] Yes  [] No    Plan for next treatment session: D/C next visit.  stretching of trunk/hips/LE, ROM, posture/body mechs, lumbar stabs, LE/trunk strength, HEP, MOC, currently pt declines aquatics due to fear of water, gait /balance    PLAN: See eval. PT 2x / week for 6 weeks. [x] Continue per plan of care [] Alter current plan (see comments)  [] Plan of care initiated [] Hold pending MD visit [] Discharge    Electronically signed by: Domingo Tillman PT    Note: If patient does not return for scheduled/ recommended follow up visits, his note will serve as a discharge from care along with most recent update on progress.

## 2020-02-06 LAB
BUN / CREAT RATIO: 14 (ref 12–28)
BUN BLOOD: 26 MG/DL (ref 8–27)
CALCIUM SERPL-MCNC: 8.9 MG/DL (ref 8.7–10.3)
CHLORIDE BLD-SCNC: 101 MMOL/L (ref 96–106)
CO2: 24 MMOL/L (ref 20–29)
CREAT SERPL-MCNC: 1.83 MG/DL (ref 0.57–1)
EGFR AFRICAN AMERICAN: 28 ML/MIN/1.73
EGFR IF NONAFRICAN AMERICAN: 25 ML/MIN/1.73
GLUCOSE BLD-MCNC: 111 MG/DL (ref 65–99)
POTASSIUM SERPL-SCNC: 5.5 MMOL/L (ref 3.5–5.2)
SODIUM BLD-SCNC: 141 MMOL/L (ref 134–144)

## 2020-02-18 LAB
ANION GAP SERPL CALCULATED.3IONS-SCNC: 4 MMOL/L (ref 7–16)
BUN BLDV-MCNC: 27 MG/DL (ref 7–18)
CALCIUM SERPL-MCNC: 8.4 MG/DL (ref 8.5–10.1)
CHLORIDE BLD-SCNC: 104 MMOL/L (ref 98–107)
CO2: 31 MMOL/L (ref 21–32)
CREATININE + EGFR PANEL: 2.1 MG/DL (ref 0.6–1.3)
GFR AFRICAN AMERICAN: 27 ML/MIN/1.73M2
GFR NON-AFRICAN AMERICAN: 22 ML/MIN/1.73M2
GLUCOSE: 114 MG/DL (ref 74–106)
POTASSIUM SERPL-SCNC: 4.9 MMOL/L (ref 3.5–5.1)
SODIUM BLD-SCNC: 139 MMOL/L (ref 136–145)

## 2020-05-15 PROBLEM — J43.1 PANLOBULAR EMPHYSEMA (HCC): Status: ACTIVE | Noted: 2020-05-15

## 2020-07-28 PROBLEM — I50.32 CHRONIC DIASTOLIC HEART FAILURE (HCC): Status: ACTIVE | Noted: 2020-07-28

## 2020-08-20 ENCOUNTER — OFFICE VISIT (OUTPATIENT)
Dept: ORTHOPEDIC SURGERY | Age: 85
End: 2020-08-20
Payer: MEDICARE

## 2020-08-20 VITALS — HEIGHT: 60 IN | RESPIRATION RATE: 16 BRPM | WEIGHT: 120 LBS | BODY MASS INDEX: 23.56 KG/M2 | TEMPERATURE: 97.3 F

## 2020-08-20 PROCEDURE — G8427 DOCREV CUR MEDS BY ELIG CLIN: HCPCS | Performed by: PHYSICIAN ASSISTANT

## 2020-08-20 PROCEDURE — 99213 OFFICE O/P EST LOW 20 MIN: CPT | Performed by: PHYSICIAN ASSISTANT

## 2020-08-20 PROCEDURE — 1123F ACP DISCUSS/DSCN MKR DOCD: CPT | Performed by: PHYSICIAN ASSISTANT

## 2020-08-20 PROCEDURE — G8420 CALC BMI NORM PARAMETERS: HCPCS | Performed by: PHYSICIAN ASSISTANT

## 2020-08-20 PROCEDURE — 1036F TOBACCO NON-USER: CPT | Performed by: PHYSICIAN ASSISTANT

## 2020-08-20 PROCEDURE — 4040F PNEUMOC VAC/ADMIN/RCVD: CPT | Performed by: PHYSICIAN ASSISTANT

## 2020-08-20 PROCEDURE — 1090F PRES/ABSN URINE INCON ASSESS: CPT | Performed by: PHYSICIAN ASSISTANT

## 2020-08-20 NOTE — PROGRESS NOTES
Subjective:      Patient ID: Jacky Doll is a 80 y.o. female. Chief Complaint   Patient presents with    Knee Problem     R TKA 3/4/16 yearly check        HPI: She is here for annual follow up on right knee arthroplasty. The date of procedure(s) 3/2016. No new complaints or issues. There is minimal to no discomfort with ambulation. There is minimal to no discomfort at rest.   Steps can be performed in reciprocal fashion. Review of Systems:   A full list of the ROS have been reviewed. These are recorded and signed in the chart. Past Medical History:   Diagnosis Date    COPD (chronic obstructive pulmonary disease) ([address]     DDD (degenerative disc disease)     Diabetes mellitus ([address]     Hypertension     Macular degeneration     bilateral    PVD (peripheral vascular disease) ([address]     Reflux     Thyroid disease        Family History   Problem Relation Age of Onset    Diabetes Mother     Heart Disease Father        Past Surgical History:   Procedure Laterality Date    ABDOMEN SURGERY      BIOPSY MOUTH LESION Right 6/17/2019    EXCISE LESION ON RIGHT SIDE CHEEK performed by Ric Ryan MD at [facility] IMPLANT Right     CHOLECYSTECTOMY      COLON SURGERY      small bowel polyps    EYE SURGERY      for macular deg    HYSTERECTOMY      JOINT REPLACEMENT Right 03-    knee    KNEE ARTHROSCOPY  8-20-15    right medial menisectomy loose body.      KNEE ARTHROSCOPY Right 8/20/2015    RIGHT KNEE ARTHROSCOPIC PARTIAL MENISECTOMY AND REMOVAL OF LOOSE BODY    OR EXCIS MOUTH MUCOSA/SUB,NO REPAIR Right 10/31/2018    EXCISION MASS RIGHT BUCCAL MUCOSA performed by Ric Ryan MD at [address] [place] [address] PRE-MALIGNANT / BENIGN SKIN LESION EXCISION  3/4/11    inside right cheek    SINUS SURGERY      TONSILLECTOMY         Social History     Occupational History    Not on file   Tobacco Use    Smoking status: Former Smoker    Smokeless tobacco: Never Used    Tobacco comment: quit [id number]   Substance and Sexual Activity    Alcohol use: No    Drug use: No    Sexual activity: Not on file       Current Outpatient Medications   Medication Sig Dispense Refill    metoprolol (LOPRESSOR) 100 MG tablet Take 1 tablet by mouth 2 times daily 180 tablet 0    clopidogrel (PLAVIX) 75 MG tablet Take 1 tablet by mouth daily 90 tablet 0    apixaban (ELIQUIS) 2.5 MG TABS tablet Take 1 tablet by mouth 2 times daily 180 tablet 0    levothyroxine (SYNTHROID) 50 MCG tablet Take 1 tablet by mouth daily 90 tablet 0    pantoprazole (PROTONIX) 40 MG tablet Take 1 tablet by mouth daily 90 tablet 0    tiZANidine (ZANAFLEX) 4 MG tablet Take 1 tablet by mouth nightly 90 tablet 0    spironolactone (ALDACTONE) 25 MG tablet Take 1 tablet by mouth daily 90 tablet 0    Calcium Polycarbophil (FIBER) 625 MG TABS Take 1 tablet by mouth 2 times daily 60 tablet 5    Calcium Carbonate-Vitamin D (CALTRATE 600+D PO) Take 1 tablet by mouth 2 times daily.  Multiple Vitamin (MULTIVITAMIN PO) Take  by mouth daily. No current facility-administered medications for this visit. Objective:     She is alert, oriented x 3, pleasant, well nourished, developed and in no acute distress. Temp 97.3 °F (36.3 °C) (Infrared)   Resp 16   Ht 5' (1.524 m)   Wt 120 lb (54.4 kg)   BMI 23.44 kg/m²      KNEE EXAM:  Examination of the right knee shows: There is  no obvious deformity. There is not erythema. There is minimal to no soft tissue swelling. There is no significant joint effusion. AROM-  Extension 0                     Flexion  125+  There is no pain associated with ROM testing. Medial joint line is not tender to palpation. Lateral joint line is not tender to palpation. There is not crepitus along the joint line with ROM testing. There is no significant instability with varus or valgus stress testing. Anterior Drawer test is negative for instability.    Extensor Mechanism is  intact. NEUROLOGICAL EXAM:  Examination of the lower extremities are intact with sensation to light touch, motor testing 4+ to 5/5 in all major motor groups including hip abductors, hip adductors, Quadriceps, hamstring, dorsi flexors and EHL testing. Normal heel to toe gait. VASCULAR EXAM:  Examination of the upper and lower extremities shows intact perfusion to all extremities, no cyanosis, digits are warm to touch, capillary refill is less than 2 seconds. No significant edema noted. SKIN:  Examination of the skin reveals the skin to be intact without lacerations, abrasions, significant erythema, rashes or skin lesions. X Rays: performed in the office today:   AP, Lateral and Sunrise of the right Knee: There is a right prosthetic total knee arthroplasty present. The alignment is satisfactory. There are no signs of significant failure or loosening. Assessment:       ICD-10-CM    1. History of total right knee replacement 3/4/2016  Z96.651 XR KNEE RIGHT (3 VIEWS)        Plan:       Clinically and radiographically stable right knee arthroplasty now 4 years postop. The natural history of the patient's diagnosis as well as the treatment options were discussed in full and questions were answered. Risks and benefits of the treatment options also reviewed in detail. Continue with a HEP-  A home exercise program was re-instructed today including ROM exercises and strengthening exercises. The patient verbalized understanding of these exercises as well as the importance of the exercise program to promote return of normal function. If pain intensifies or other problems arise you are to notify the office. Prophylactic antibiotics for any surgical or dental procedures. This is recommended for lifetime. Follow up yearly with x ray and clinical evaluations. Call or return to clinic prn if these symptoms worsen or fail to improve as anticipated.

## 2020-08-25 ENCOUNTER — OFFICE VISIT (OUTPATIENT)
Dept: VASCULAR SURGERY | Age: 85
End: 2020-08-25
Payer: MEDICARE

## 2020-08-25 VITALS
HEIGHT: 60 IN | BODY MASS INDEX: 23.6 KG/M2 | TEMPERATURE: 96.4 F | SYSTOLIC BLOOD PRESSURE: 134 MMHG | WEIGHT: 120.2 LBS | DIASTOLIC BLOOD PRESSURE: 70 MMHG

## 2020-08-25 PROCEDURE — 4040F PNEUMOC VAC/ADMIN/RCVD: CPT | Performed by: SURGERY

## 2020-08-25 PROCEDURE — 1036F TOBACCO NON-USER: CPT | Performed by: SURGERY

## 2020-08-25 PROCEDURE — G8427 DOCREV CUR MEDS BY ELIG CLIN: HCPCS | Performed by: SURGERY

## 2020-08-25 PROCEDURE — 99212 OFFICE O/P EST SF 10 MIN: CPT | Performed by: SURGERY

## 2020-08-25 PROCEDURE — 1123F ACP DISCUSS/DSCN MKR DOCD: CPT | Performed by: SURGERY

## 2020-08-25 PROCEDURE — G8420 CALC BMI NORM PARAMETERS: HCPCS | Performed by: SURGERY

## 2020-08-25 PROCEDURE — 1090F PRES/ABSN URINE INCON ASSESS: CPT | Performed by: SURGERY

## 2020-08-25 NOTE — PROGRESS NOTES
[facility])   Vascular Surgery Followup    Referring Provider:  Gayla Melchor MD     Chief Complaint   Patient presents with    1 Year Follow Up        History of Present Illness:  80-year-old female here today for follow-up of peripheral vascular disease. Patient with known history of peripheral angiogram and intervention for lower extremity claudication. Last intervention in April 2018. Barbara Castro She has noted slightly increased pain in both shins and feet. Not a significant change from 2018. Still is fairly active. Pain can occur with activity and with rest.    Past Medical History:   has a past medical history of COPD (chronic obstructive pulmonary disease) ([facility] DDD (degenerative disc disease), Diabetes mellitus ([facility] Hypertension, Macular degeneration, PVD (peripheral vascular disease) ([facility] Reflux, and Thyroid disease. Surgical History:   has a past surgical history that includes sinus surgery; Tonsillectomy; Hysterectomy; Cholecystectomy; eye surgery; Colon surgery; pre-malignant / benign skin lesion excision (3/4/11); Cataract removal with implant (Right); Knee arthroscopy (8-20-15); Knee arthroscopy (Right, 8/20/2015); joint replacement (Right, 03-); Abdomen surgery; pr excis mouth mucosa/sub,no repair (Right, 10/31/2018); and Biopsy mouth lesion (Right, 6/17/2019). Social History:   reports that she has quit smoking. She has never used smokeless tobacco. She reports that she does not drink alcohol or use drugs. Family History:  family history includes Diabetes in her mother; Heart Disease in her father.      Home Medications:  Current Outpatient Medications   Medication Sig Dispense Refill    metoprolol (LOPRESSOR) 100 MG tablet Take 1 tablet by mouth 2 times daily 180 tablet 0    clopidogrel (PLAVIX) 75 MG tablet Take 1 tablet by mouth daily 90 tablet 0    apixaban (ELIQUIS) 2.5 MG TABS tablet Take 1 tablet by mouth 2 times daily 180 tablet 0    levothyroxine (SYNTHROID) 50 MCG tablet Take 1 tablet by mouth daily 90 tablet 0    pantoprazole (PROTONIX) 40 MG tablet Take 1 tablet by mouth daily 90 tablet 0    tiZANidine (ZANAFLEX) 4 MG tablet Take 1 tablet by mouth nightly 90 tablet 0    spironolactone (ALDACTONE) 25 MG tablet Take 1 tablet by mouth daily 90 tablet 0    Calcium Polycarbophil (FIBER) 625 MG TABS Take 1 tablet by mouth 2 times daily 60 tablet 5    Calcium Carbonate-Vitamin D (CALTRATE 600+D PO) Take 1 tablet by mouth 2 times daily.  Multiple Vitamin (MULTIVITAMIN PO) Take  by mouth daily. No current facility-administered medications for this visit. Allergies:  Cephalexin and Morphine     Review of Systems:   · Constitutional: there has been no unanticipated weight loss. There's been no change in energy level, sleep pattern, or activity level. · Eyes: No visual changes or diplopia. No scleral icterus. · ENT: No Headaches, hearing loss or vertigo. No mouth sores or sore throat. · Cardiovascular: Reviewed in HPI  · Respiratory: No cough or wheezing, no sputum production. No hematemesis. · Gastrointestinal: No abdominal pain, appetite loss, blood in stools. No change in bowel or bladder habits. · Genitourinary: No dysuria, trouble voiding, or hematuria. · Musculoskeletal:  No gait disturbance, weakness or joint complaints. · Integumentary: No rash or pruritis. · Neurological: No headache, diplopia, change in muscle strength, numbness or tingling. No change in gait, balance, coordination, mood, affect, memory, mentation, behavior. · Psychiatric: No anxiety, no depression. · Endocrine: No malaise, fatigue or temperature intolerance. No excessive thirst, fluid intake, or urination. No tremor. · Hematologic/Lymphatic: No abnormal bruising or bleeding, blood clots or swollen lymph nodes. · Allergic/Immunologic: No nasal congestion or hives.     Physical Examination:    Vitals:    08/25/20 1325   BP: 134/70   Temp: 96.4 °F (35.8 °C) General appearance: alert, appears stated age, cooperative and no distress  Head: Normocephalic, without obvious abnormality, atraumatic  Neck: no adenopathy, no carotid bruit, no JVD, supple, symmetrical, trachea midline and thyroid: not enlarged, symmetric, no tenderness/mass/nodules  Lungs: clear to auscultation bilaterally  Heart: regular rate and rhythm  Abdomen: soft, non-tender. Bowel sounds normal. No masses,  no organomegaly  Extremities: venous stasis dermatitis noted and . Nonpalpable pedal pulses. Monophasic pedal signals. Neurologic: Grossly normal    Assessment:     Patient Active Problem List   Diagnosis    Essential hypertension    Osteopenia    Cervical radiculopathy    Thoracic back pain    Gastroesophageal reflux disease without esophagitis    Acquired hypothyroidism    PVD [facility]peripheral vascular disease) [facility]    History of total right knee replacement-3. 4.2016    Extremity atherosclerosis with intermittent claudication (HCC)    Controlled type 2 diabetes mellitus with diabetic nephropathy, without long-term current use of insulin (HCC)    Paroxysmal atrial fibrillation (HCC)    Thyroid nodule    Atherosclerosis of native arteries of extremities with rest pain, bilateral legs (HCC)    Spondylosis of lumbar spine    Benign neoplasm of minor salivary gland    Chronic kidney disease, stage III (moderate) (HCC)    Panlobular emphysema (HCC)    Chronic diastolic heart failure (HCC)       Plan:  1. Atherosclerosis of native arteries of extremities with rest pain, bilateral legs [facility])  40-year-old female with slightly progressive pain at rest in both lower extremity. We'll check arterial duplex in contact with results. - VL DUP LOWER EXTREMITY ARTERIES BILATERAL; Future        Thank you for allowing me to participate in the care of this individual.  Please do not hesitate to contact me with any questions. Micael Chroman Belia Heimlich M.D., FACS.   8/25/2020  1:33 PM

## 2020-09-18 ENCOUNTER — HOSPITAL ENCOUNTER (OUTPATIENT)
Dept: VASCULAR LAB | Age: 85
Discharge: HOME | End: 2020-09-18
Payer: MEDICARE

## 2020-09-18 PROCEDURE — 93925 LOWER EXTREMITY STUDY: CPT

## 2020-09-29 ENCOUNTER — OFFICE VISIT (OUTPATIENT)
Dept: ENT CLINIC | Age: 85
End: 2020-09-29
Payer: MEDICARE

## 2020-09-29 VITALS — DIASTOLIC BLOOD PRESSURE: 70 MMHG | TEMPERATURE: 97.9 F | SYSTOLIC BLOOD PRESSURE: 155 MMHG | HEART RATE: 72 BPM

## 2020-09-29 PROCEDURE — G8427 DOCREV CUR MEDS BY ELIG CLIN: HCPCS | Performed by: OTOLARYNGOLOGY

## 2020-09-29 PROCEDURE — G8420 CALC BMI NORM PARAMETERS: HCPCS | Performed by: OTOLARYNGOLOGY

## 2020-09-29 PROCEDURE — 4040F PNEUMOC VAC/ADMIN/RCVD: CPT | Performed by: OTOLARYNGOLOGY

## 2020-09-29 PROCEDURE — 1036F TOBACCO NON-USER: CPT | Performed by: OTOLARYNGOLOGY

## 2020-09-29 PROCEDURE — 1090F PRES/ABSN URINE INCON ASSESS: CPT | Performed by: OTOLARYNGOLOGY

## 2020-09-29 PROCEDURE — 1123F ACP DISCUSS/DSCN MKR DOCD: CPT | Performed by: OTOLARYNGOLOGY

## 2020-09-29 PROCEDURE — 99213 OFFICE O/P EST LOW 20 MIN: CPT | Performed by: OTOLARYNGOLOGY

## 2020-09-29 NOTE — PROGRESS NOTES
Patient is had no mouth complaints but has noted some discomfort in the left temporomandibular joint region. Otherwise she has been doing exceedingly well. Currently, she appears in no obvious acute distress. Ear examination reveals some minimal accumulation of cerumen which is removed on both sides with a curette. The tympanic membranes on both sides are entirely normal.  There is some tenderness to the left temporomandibular joint area but no mass lesions are noted in either parotid gland. The nasal mucosa is normal.  The neck is free of any adenopathy, mass, thyroid enlargement. The area of previous excision of minor salivary gland benign tumor seems to be entirely stable with no increase in size or tenderness. Therefore, we will continue observation. I will see her again in March. She is on medication for arthritic pain so we will not add any more as far as the temporomandibular joint is concerned.

## 2020-10-07 ENCOUNTER — TELEPHONE (OUTPATIENT)
Dept: VASCULAR SURGERY | Age: 85
End: 2020-10-07

## 2020-10-07 LAB
CATARACTS: POSITIVE
DIABETIC RETINOPATHY: NEGATIVE
GLAUCOMA: NEGATIVE
INTRAOCULAR PRESSURE EYE: NORMAL
VISUAL ACUITY DISTANCE LEFT EYE: NORMAL
VISUAL ACUITY DISTANCE RIGHT EYE: NORMAL

## 2021-01-20 ENCOUNTER — PREP FOR PROCEDURE (OUTPATIENT)
Dept: VASCULAR SURGERY | Age: 86
End: 2021-01-20

## 2021-01-20 ENCOUNTER — TELEPHONE (OUTPATIENT)
Dept: VASCULAR SURGERY | Age: 86
End: 2021-01-20

## 2021-01-20 DIAGNOSIS — Z01.818 PREOP TESTING: ICD-10-CM

## 2021-01-20 DIAGNOSIS — I70.223 ATHEROSCLEROSIS OF NATIVE ARTERIES OF EXTREMITIES WITH REST PAIN, BILATERAL LEGS (HCC): Primary | ICD-10-CM

## 2021-01-20 RX ORDER — SODIUM CHLORIDE 0.9 % (FLUSH) 0.9 %
10 SYRINGE (ML) INJECTION PRN
Status: CANCELLED | OUTPATIENT
Start: 2021-01-20

## 2021-01-20 RX ORDER — SODIUM CHLORIDE 0.9 % (FLUSH) 0.9 %
10 SYRINGE (ML) INJECTION EVERY 12 HOURS SCHEDULED
Status: CANCELLED | OUTPATIENT
Start: 2021-01-20

## 2021-01-20 RX ORDER — SODIUM CHLORIDE 9 MG/ML
INJECTION, SOLUTION INTRAVENOUS CONTINUOUS
Status: CANCELLED | OUTPATIENT
Start: 2021-01-20

## 2021-01-21 ENCOUNTER — HOSPITAL ENCOUNTER (OUTPATIENT)
Age: 86
Discharge: HOME | End: 2021-01-21
Payer: MEDICARE

## 2021-01-21 DIAGNOSIS — I70.223 ATHEROSCLEROSIS OF NATIVE ARTERIES OF EXTREMITIES WITH REST PAIN, BILATERAL LEGS (HCC): ICD-10-CM

## 2021-01-21 DIAGNOSIS — Z01.818 PREOP TESTING: ICD-10-CM

## 2021-01-21 LAB
ANION GAP SERPL CALCULATED.3IONS-SCNC: 12 MMOL/L (ref 3–16)
BASOPHILS ABSOLUTE: 0 K/UL (ref 0–0.2)
BASOPHILS RELATIVE PERCENT: 0.6 %
BUN BLDV-MCNC: 31 MG/DL (ref 7–20)
CALCIUM SERPL-MCNC: 9.3 MG/DL (ref 8.3–10.6)
CHLORIDE BLD-SCNC: 100 MMOL/L (ref 99–110)
CO2: 25 MMOL/L (ref 21–32)
CREAT SERPL-MCNC: 2.1 MG/DL (ref 0.6–1.2)
EOSINOPHILS ABSOLUTE: 0.1 K/UL (ref 0–0.6)
EOSINOPHILS RELATIVE PERCENT: 1.7 %
GFR AFRICAN AMERICAN: 27
GFR NON-AFRICAN AMERICAN: 22
GLUCOSE BLD-MCNC: 87 MG/DL (ref 70–99)
HCT VFR BLD CALC: 40.5 % (ref 36–48)
HEMOGLOBIN: 13.5 G/DL (ref 12–16)
LYMPHOCYTES ABSOLUTE: 1.5 K/UL (ref 1–5.1)
LYMPHOCYTES RELATIVE PERCENT: 21.6 %
MCH RBC QN AUTO: 31.2 PG (ref 26–34)
MCHC RBC AUTO-ENTMCNC: 33.4 G/DL (ref 31–36)
MCV RBC AUTO: 93.4 FL (ref 80–100)
MONOCYTES ABSOLUTE: 0.5 K/UL (ref 0–1.3)
MONOCYTES RELATIVE PERCENT: 7.2 %
NEUTROPHILS ABSOLUTE: 4.8 K/UL (ref 1.7–7.7)
NEUTROPHILS RELATIVE PERCENT: 68.9 %
PDW BLD-RTO: 13.6 % (ref 12.4–15.4)
PLATELET # BLD: 239 K/UL (ref 135–450)
PMV BLD AUTO: 8.5 FL (ref 5–10.5)
POTASSIUM SERPL-SCNC: 4.8 MMOL/L (ref 3.5–5.1)
RBC # BLD: 4.33 M/UL (ref 4–5.2)
SODIUM BLD-SCNC: 137 MMOL/L (ref 136–145)
WBC # BLD: 6.9 K/UL (ref 4–11)

## 2021-01-21 PROCEDURE — 85025 COMPLETE CBC W/AUTO DIFF WBC: CPT

## 2021-01-21 PROCEDURE — 36415 COLL VENOUS BLD VENIPUNCTURE: CPT

## 2021-01-21 PROCEDURE — 80048 BASIC METABOLIC PNL TOTAL CA: CPT

## 2021-01-25 ENCOUNTER — TELEPHONE (OUTPATIENT)
Dept: VASCULAR SURGERY | Age: 86
End: 2021-01-25

## 2021-01-25 NOTE — TELEPHONE ENCOUNTER
----- Message from TREMAINE Carrion CNP sent at 1/22/2021  8:33 AM EST -----  Patient scheduled for angio on 2/3. She will need hydration, her creatinine is 2.1. Thanks.

## 2021-02-03 ENCOUNTER — HOSPITAL ENCOUNTER (OUTPATIENT)
Dept: CARDIAC CATH/INVASIVE PROCEDURES | Age: 86
Discharge: HOME | End: 2021-02-03
Attending: SURGERY | Admitting: SURGERY
Payer: MEDICARE

## 2021-02-03 LAB
ANION GAP SERPL CALCULATED.3IONS-SCNC: 11 MMOL/L (ref 3–16)
BUN BLDV-MCNC: 24 MG/DL (ref 7–20)
CALCIUM SERPL-MCNC: 9.3 MG/DL (ref 8.3–10.6)
CHLORIDE BLD-SCNC: 105 MMOL/L (ref 99–110)
CO2: 24 MMOL/L (ref 21–32)
CREAT SERPL-MCNC: 2.1 MG/DL (ref 0.6–1.2)
GFR AFRICAN AMERICAN: 27
GFR NON-AFRICAN AMERICAN: 22
GLUCOSE BLD-MCNC: 127 MG/DL (ref 70–99)
HCT VFR BLD CALC: 42.6 % (ref 36–48)
HEMOGLOBIN: 14.1 G/DL (ref 12–16)
MCH RBC QN AUTO: 30.9 PG (ref 26–34)
MCHC RBC AUTO-ENTMCNC: 33.1 G/DL (ref 31–36)
MCV RBC AUTO: 93.2 FL (ref 80–100)
PDW BLD-RTO: 13.5 % (ref 12.4–15.4)
PLATELET # BLD: 258 K/UL (ref 135–450)
PMV BLD AUTO: 7.8 FL (ref 5–10.5)
POTASSIUM SERPL-SCNC: 4.9 MMOL/L (ref 3.5–5.1)
RBC # BLD: 4.57 M/UL (ref 4–5.2)
SODIUM BLD-SCNC: 140 MMOL/L (ref 136–145)
WBC # BLD: 6.2 K/UL (ref 4–11)

## 2021-02-03 PROCEDURE — 75716 ARTERY X-RAYS ARMS/LEGS: CPT

## 2021-02-03 PROCEDURE — 76937 US GUIDE VASCULAR ACCESS: CPT | Performed by: SURGERY

## 2021-02-03 PROCEDURE — 99152 MOD SED SAME PHYS/QHP 5/>YRS: CPT

## 2021-02-03 PROCEDURE — 75625 CONTRAST EXAM ABDOMINL AORTA: CPT

## 2021-02-03 PROCEDURE — 99153 MOD SED SAME PHYS/QHP EA: CPT

## 2021-02-03 PROCEDURE — C1894 INTRO/SHEATH, NON-LASER: HCPCS

## 2021-02-03 PROCEDURE — 85027 COMPLETE CBC AUTOMATED: CPT

## 2021-02-03 PROCEDURE — 6360000002 HC RX W HCPCS

## 2021-02-03 PROCEDURE — 75625 CONTRAST EXAM ABDOMINL AORTA: CPT | Performed by: SURGERY

## 2021-02-03 PROCEDURE — 2500000003 HC RX 250 WO HCPCS

## 2021-02-03 PROCEDURE — 6360000004 HC RX CONTRAST MEDICATION: Performed by: SURGERY

## 2021-02-03 PROCEDURE — C1887 CATHETER, GUIDING: HCPCS

## 2021-02-03 PROCEDURE — 36200 PLACE CATHETER IN AORTA: CPT

## 2021-02-03 PROCEDURE — 80048 BASIC METABOLIC PNL TOTAL CA: CPT

## 2021-02-03 PROCEDURE — 99152 MOD SED SAME PHYS/QHP 5/>YRS: CPT | Performed by: SURGERY

## 2021-02-03 PROCEDURE — 75716 ARTERY X-RAYS ARMS/LEGS: CPT | Performed by: SURGERY

## 2021-02-03 PROCEDURE — 36200 PLACE CATHETER IN AORTA: CPT | Performed by: SURGERY

## 2021-02-03 PROCEDURE — 36415 COLL VENOUS BLD VENIPUNCTURE: CPT

## 2021-02-03 PROCEDURE — 2709999900 HC NON-CHARGEABLE SUPPLY

## 2021-02-03 PROCEDURE — C1769 GUIDE WIRE: HCPCS

## 2021-02-03 RX ADMIN — IOPAMIDOL 37 ML: 755 INJECTION, SOLUTION INTRAVENOUS at 14:39

## 2021-02-03 NOTE — H&P
spironolactone (ALDACTONE) 25 MG tablet Take 1 tablet by mouth daily 1/18/21 4/18/21  Chico Leal MD   albuterol sulfate HFA (PROVENTIL HFA) 108 (90 Base) MCG/ACT inhaler Inhale 2 puffs into the lungs every 6 hours as needed for Wheezing 1/18/21 4/18/21  Chico Leal MD   Calcium Polycarbophil (FIBER) 625 MG TABS Take 1 tablet by mouth 2 times daily 8/31/17   Chico Leal MD   Calcium Carbonate-Vitamin D (CALTRATE 600+D PO) Take 1 tablet by mouth 2 times daily. 2/28/11   Historical Provider, MD   Multiple Vitamin (MULTIVITAMIN PO) Take  by mouth daily. 2/28/11   Historical Provider, MD        Hospital Meds:    No current facility-administered medications for this encounter. Social History:       Social History     Socioeconomic History    Marital status:       Spouse name: Not on file    Number of children: Not on file    Years of education: Not on file    Highest education level: Not on file   Occupational History    Not on file   Social Needs    Financial resource strain: Not on file    Food insecurity     Worry: Not on file     Inability: Not on file    Transportation needs     Medical: Not on file     Non-medical: Not on file   Tobacco Use    Smoking status: Former Smoker    Smokeless tobacco: Never Used    Tobacco comment: quit [id number]   Substance and Sexual Activity    Alcohol use: No    Drug use: No    Sexual activity: Not on file   Lifestyle    Physical activity     Days per week: Not on file     Minutes per session: Not on file    Stress: Not on file   Relationships    Social connections     Talks on phone: Not on file     Gets together: Not on file     Attends Orthodox service: Not on file     Active member of club or organization: Not on file     Attends meetings of clubs or organizations: Not on file     Relationship status: Not on file    Intimate partner violence     Fear of current or ex partner: Not on file     Emotionally abused: Not on file Physically abused: Not on file     Forced sexual activity: Not on file   Other Topics Concern    Not on file   Social History Narrative    Not on file       Family Histroy:      Family History   Problem Relation Age of Onset    Diabetes Mother     Heart Disease Father        Review of Systems:  Review of systems performed and negative with the exception of the above findings        Physical Exam   Vital Signs: There were no vitals taken for this visit. Admission     ASA 3 - Patient with moderate systemic disease with functional limitations    Mallampati Airway Assessment:  Mallampati Class II - (soft palate, fauces & uvula are visible)    General appearance: alert, appears stated age, cooperative and no distress  Head: Normocephalic, without obvious abnormality, atraumatic  Lungs: clear to auscultation bilaterally  Heart: regular rate and rhythm, S1, S2 normal, no murmur, click, rub or gallop  Abdomen: soft, non-tender.  Bowel sounds normal. No masses,  no organomegaly  Extremities: extremities normal, atraumatic, no cyanosis or edema  Pulses:      Labs:    BMP:   Lab Results   Component Value Date     02/03/2021    K 4.9 02/03/2021     02/03/2021    CO2 24 02/03/2021    BUN 24 02/03/2021    CREATININE 2.1 02/03/2021      No components found for: GLUNo results found for: MG   CBC:   Lab Results   Component Value Date    WBC 6.2 02/03/2021    HGB 14.1 02/03/2021    HCT 42.6 02/03/2021    MCV 93.2 02/03/2021     02/03/2021      Coagulation:   Lab Results   Component Value Date    INR 1.00 04/02/2018    APTT 33.0 04/02/2018     Cardiac markers:   No results found for: CKMB, CKTOTAL, TROPONINI, MYOGLOBIN  Lab Results   Component Value Date    LABA1C 5.6 03/29/2019    No results found for: ALB    Lab Results   Component Value Date    BILITOT 0.3 04/12/2017    BILIDIR 0.05 04/27/2010    AST 13 04/12/2017    ALT 12 04/12/2017    ALKPHOS 69 04/12/2017      Lab Results   Component Value Date

## 2021-02-04 NOTE — OP NOTE
[address]                     [address] [facility], [address] [facility]                                OPERATIVE REPORT    PATIENT NAME: Caleb Montoya                      :        1933  MED REC NO:   [id number]                          ROOM:  ACCOUNT NO:   [de-identified]                           ADMIT DATE: 2021  PROVIDER:     Ashely Samuel Ii, MD    DATE OF PROCEDURE:  2021    PREPROCEDURE DIAGNOSIS:  Bilateral lower extremity rest pain. POSTPROCEDURE DIAGNOSIS:  Bilateral lower extremity rest pain. PROCEDURES:  1. Ultrasound-guided left common femoral artery access. 2.  Abdominal aortogram with bilateral lower extremity runoff. SURGEON:  Elmo Duran MD    ANESTHESIA:  Local/IV. ESTIMATED BLOOD LOSS:  Minimal    COMPLICATIONS:  None. INDICATIONS:  The patient is an 42-year-old female with a known history  of COPD, severe degenerative lumbar disc disease, diabetes and  hypertension, who presents today for peripheral angiography. All risks,  benefits and alternatives were discussed in detail including the risk of  contrast induced nephropathy. All questions were answered. PROCEDURE DETAILS:  After witnessed form consent was obtained, the  patient was brought to the cath lab where under my supervision Versed  and fentanyl were administered intravenously for moderate sedation. Blood pressure, pulse oximetry, heart rate were monitored by an  independent trained observer that was present. I spent 30 minutes of  face-to-face sedation time with the patient. Her left groin was  carefully prepped and draped. Ultrasound was used to identify the left  common femoral artery, which was noted to be patent and image was saved  to the patient's permanent medical record. Under direct visualization,  it was accessed with a 4-Armenian micropuncture needle. Bentson wire was  introduced and a 5-Armenian sheath was placed.   An Omniflush catheter was inserted to the level of renal arteries and an abdominal aortogram was  performed. It was pulled back to the level of the aortic bifurcation  and a bilateral lower extremity runoff was performed. The patient had  moderate tibial disease; however, no other proximal disease that would  explain her symptoms. Procedure was terminated at this point. Manual  pressure was held. She tolerated the procedure well and transferred to  the recovery room in stable condition. FINDINGS:  1. Abdominal aortogram.  Right and left renal patent. Infrarenal  abdominal aorta patent. Bilateral common external and internal iliac  arteries are patent. 2.  Right lower extremity runoff. Right common femoral and profunda are  patent. SFA shows very mild atherosclerosis. No focal stenosis greater  than 20%. Popliteal is patent. There is a single-vessel peroneal  runoff on the right. 3.  Left lower extremity runoff. Left common femoral, profunda and SFA  are patent. Popliteal is patent. There is a single-vessel runoff  peroneal on the left. PLAN:  The patient underwent peripheral angiography. This shows widely  patent iliofemoral and popliteal sections. She does have a tibial  disease; however, it would not contribute to her symptoms. I do feel  that the majority of her symptoms are likely metabolic as well as  degenerative disc disease. She will continue with her ongoing exercise  regimen.         Darien Mendez MD    D: 02/03/2021 14:50:28       T: 02/03/2021 14:57:37     [email or web address]  Job#: [id number]     Doc#: [id number]    CC:

## 2021-02-24 ENCOUNTER — OFFICE VISIT (OUTPATIENT)
Dept: VASCULAR SURGERY | Age: 86
End: 2021-02-24
Payer: MEDICARE

## 2021-02-24 VITALS
WEIGHT: 123 LBS | TEMPERATURE: 97.1 F | BODY MASS INDEX: 24.15 KG/M2 | HEIGHT: 60 IN | DIASTOLIC BLOOD PRESSURE: 80 MMHG | SYSTOLIC BLOOD PRESSURE: 140 MMHG

## 2021-02-24 DIAGNOSIS — I70.223 ATHEROSCLEROSIS OF NATIVE ARTERIES OF EXTREMITIES WITH REST PAIN, BILATERAL LEGS (HCC): Primary | ICD-10-CM

## 2021-02-24 PROCEDURE — 1090F PRES/ABSN URINE INCON ASSESS: CPT | Performed by: SURGERY

## 2021-02-24 PROCEDURE — G8427 DOCREV CUR MEDS BY ELIG CLIN: HCPCS | Performed by: SURGERY

## 2021-02-24 PROCEDURE — G8482 FLU IMMUNIZE ORDER/ADMIN: HCPCS | Performed by: SURGERY

## 2021-02-24 PROCEDURE — 1123F ACP DISCUSS/DSCN MKR DOCD: CPT | Performed by: SURGERY

## 2021-02-24 PROCEDURE — 99212 OFFICE O/P EST SF 10 MIN: CPT | Performed by: SURGERY

## 2021-02-24 PROCEDURE — 4040F PNEUMOC VAC/ADMIN/RCVD: CPT | Performed by: SURGERY

## 2021-02-24 PROCEDURE — 1036F TOBACCO NON-USER: CPT | Performed by: SURGERY

## 2021-02-24 PROCEDURE — G8420 CALC BMI NORM PARAMETERS: HCPCS | Performed by: SURGERY

## 2021-02-24 NOTE — PROGRESS NOTES
[facility])   Vascular Surgery Followup    Referring Provider:  Shahzad Thurston MD     Chief Complaint   Patient presents with    Follow-up        History of Present Illness:  24-year-old female here today for follow-up of bilateral lower extremity rest pain. She underwent recent peripheral angiography February 3, 2021. She has widely patent iliofemoral segments as well as SFA and popliteal.  She has single-vessel peroneal runoff bilaterally. Still has occasional discomfort in her calves at night. Does feel some mild swelling in her legs. Does not wear compression    Past Medical History:   has a past medical history of COPD (chronic obstructive pulmonary disease) ([facility] DDD (degenerative disc disease), Diabetes mellitus ([facility] Hypertension, Macular degeneration, PVD (peripheral vascular disease) ([facility] Reflux, and Thyroid disease. Surgical History:   has a past surgical history that includes sinus surgery; Tonsillectomy; Hysterectomy; Cholecystectomy; eye surgery; Colon surgery; pre-malignant / benign skin lesion excision (3/4/11); Cataract removal with implant (Right); Knee arthroscopy (8-20-15); Knee arthroscopy (Right, 8/20/2015); joint replacement (Right, 03-); Abdomen surgery; pr excis mouth mucosa/sub,no repair (Right, 10/31/2018); and Biopsy mouth lesion (Right, 6/17/2019). Social History:   reports that she has quit smoking. She has never used smokeless tobacco. She reports that she does not drink alcohol or use drugs. Family History:  family history includes Diabetes in her mother; Heart Disease in her father.      Home Medications:  Current Outpatient Medications   Medication Sig Dispense Refill    metoprolol (LOPRESSOR) 100 MG tablet Take 1 tablet by mouth 2 times daily 180 tablet 0    clopidogrel (PLAVIX) 75 MG tablet Take 1 tablet by mouth daily 90 tablet 0    apixaban (ELIQUIS) 2.5 MG TABS tablet Take 1 tablet by mouth 2 times daily 180 tablet 0    levothyroxine (SYNTHROID) 50 MCG tablet Take 1 tablet by mouth daily 90 tablet 0    pantoprazole (PROTONIX) 40 MG tablet Take 1 tablet by mouth daily 90 tablet 0    spironolactone (ALDACTONE) 25 MG tablet Take 1 tablet by mouth daily 90 tablet 0    albuterol sulfate HFA (PROVENTIL HFA) 108 (90 Base) MCG/ACT inhaler Inhale 2 puffs into the lungs every 6 hours as needed for Wheezing 3 Inhaler 1    Calcium Polycarbophil (FIBER) 625 MG TABS Take 1 tablet by mouth 2 times daily 60 tablet 5    Calcium Carbonate-Vitamin D (CALTRATE 600+D PO) Take 1 tablet by mouth 2 times daily.  Multiple Vitamin (MULTIVITAMIN PO) Take  by mouth daily. No current facility-administered medications for this visit. Allergies:  Cephalexin and Morphine     Review of Systems:   · Constitutional: there has been no unanticipated weight loss. There's been no change in energy level, sleep pattern, or activity level. · Eyes: No visual changes or diplopia. No scleral icterus. · ENT: No Headaches, hearing loss or vertigo. No mouth sores or sore throat. · Cardiovascular: Reviewed in HPI  · Respiratory: No cough or wheezing, no sputum production. No hematemesis. · Gastrointestinal: No abdominal pain, appetite loss, blood in stools. No change in bowel or bladder habits. · Genitourinary: No dysuria, trouble voiding, or hematuria. · Musculoskeletal:  No gait disturbance, weakness or joint complaints. · Integumentary: No rash or pruritis. · Neurological: No headache, diplopia, change in muscle strength, numbness or tingling. No change in gait, balance, coordination, mood, affect, memory, mentation, behavior. · Psychiatric: No anxiety, no depression. · Endocrine: No malaise, fatigue or temperature intolerance. No excessive thirst, fluid intake, or urination. No tremor. · Hematologic/Lymphatic: No abnormal bruising or bleeding, blood clots or swollen lymph nodes. · Allergic/Immunologic: No nasal congestion or hives.     Physical Examination:    Vitals:    02/24/21 1044   BP: (!) 140/80   Temp: 97.1 °F (36.2 °C)          General appearance: alert, appears stated age, cooperative and no distress  . Monophasic pedal signals. Palpable popliteal pulse. Cyanosis or ischemia. 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    Extremity atherosclerosis with intermittent claudication (HCC)    Controlled type 2 diabetes mellitus with diabetic nephropathy, without long-term current use of insulin (HCC)    Paroxysmal atrial fibrillation (HCC)    Thyroid nodule    Atherosclerosis of native arteries of extremities with rest pain, bilateral legs (HCC)    Spondylosis of lumbar spine    Benign neoplasm of minor salivary gland    Chronic kidney disease, stage III (moderate)    Panlobular emphysema (HCC)    Chronic diastolic heart failure (HCC)    Atherosclerosis of native arteries of extremities with intermittent claudication, bilateral legs (HCC)       Plan:  Patient was single-vessel peroneal runoff bilateral lower extremity. Widely patent iliofemoral section. Conservative management at this time. Follow-up with duplex of both legs in 3 months    Thank you for allowing me to participate in the care of this individual.  Please do not hesitate to contact me with any questions. Kasey Guillaume M.D., FACS.  2/24/2021  11:04 AM

## 2021-03-23 PROBLEM — N18.4 CHRONIC KIDNEY DISEASE (CKD), STAGE IV (SEVERE) (HCC): Status: ACTIVE | Noted: 2021-03-23

## 2021-03-30 ENCOUNTER — OFFICE VISIT (OUTPATIENT)
Dept: ENT CLINIC | Age: 86
End: 2021-03-30
Payer: MEDICARE

## 2021-03-30 VITALS — HEART RATE: 78 BPM | DIASTOLIC BLOOD PRESSURE: 79 MMHG | SYSTOLIC BLOOD PRESSURE: 165 MMHG | TEMPERATURE: 98.4 F

## 2021-03-30 DIAGNOSIS — H61.23 BILATERAL IMPACTED CERUMEN: Primary | ICD-10-CM

## 2021-03-30 DIAGNOSIS — D10.39: ICD-10-CM

## 2021-03-30 PROCEDURE — G8420 CALC BMI NORM PARAMETERS: HCPCS | Performed by: OTOLARYNGOLOGY

## 2021-03-30 PROCEDURE — G8427 DOCREV CUR MEDS BY ELIG CLIN: HCPCS | Performed by: OTOLARYNGOLOGY

## 2021-03-30 PROCEDURE — 4040F PNEUMOC VAC/ADMIN/RCVD: CPT | Performed by: OTOLARYNGOLOGY

## 2021-03-30 PROCEDURE — G8482 FLU IMMUNIZE ORDER/ADMIN: HCPCS | Performed by: OTOLARYNGOLOGY

## 2021-03-30 PROCEDURE — 1090F PRES/ABSN URINE INCON ASSESS: CPT | Performed by: OTOLARYNGOLOGY

## 2021-03-30 PROCEDURE — 99213 OFFICE O/P EST LOW 20 MIN: CPT | Performed by: OTOLARYNGOLOGY

## 2021-03-30 PROCEDURE — 1123F ACP DISCUSS/DSCN MKR DOCD: CPT | Performed by: OTOLARYNGOLOGY

## 2021-03-30 PROCEDURE — 1036F TOBACCO NON-USER: CPT | Performed by: OTOLARYNGOLOGY

## 2021-03-30 NOTE — PROGRESS NOTES
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 She is not having any difficulty with it, however. She has otherwise been doing well but has not gotten the Covid vaccine as of yet and she has been encouraged to try to do so. No fevers been noted. She has had no weight change. Currently, she appears in no distress. There is cerumen present in both ear canals and this is removed with curettes. Ear canals are now clear and the tympanic membranes are normal in appearance. 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 There is no evidence of ulceration or significant pain. No other abnormalities are apparent. Oral examination is otherwise unremarkable. The neck is free of any adenopathy, mass, thyroid enlargement. The nasal mucosa is normal as well. Given her age and the very slow growth, I have offered her 2 alternatives of treatment. 1 would be further removal under likely local anesthesia intraorally versus observation. She is opted for the latter. I will see her again in 3 months in this regard.

## 2021-04-07 ENCOUNTER — IMMUNIZATION (OUTPATIENT)
Dept: PRIMARY CARE CLINIC | Age: 86
End: 2021-04-07
Payer: MEDICARE

## 2021-04-07 PROCEDURE — 91301 COVID-19, MODERNA VACCINE 100MCG/0.5ML DOSE: CPT | Performed by: FAMILY MEDICINE

## 2021-04-07 PROCEDURE — 0011A COVID-19, MODERNA VACCINE 100MCG/0.5ML DOSE: CPT | Performed by: FAMILY MEDICINE

## 2021-05-05 ENCOUNTER — IMMUNIZATION (OUTPATIENT)
Dept: PRIMARY CARE CLINIC | Age: 86
End: 2021-05-05
Payer: MEDICARE

## 2021-05-05 PROCEDURE — 0012A COVID-19, MODERNA VACCINE 100MCG/0.5ML DOSE: CPT | Performed by: FAMILY MEDICINE

## 2021-05-05 PROCEDURE — 91301 COVID-19, MODERNA VACCINE 100MCG/0.5ML DOSE: CPT | Performed by: FAMILY MEDICINE

## 2021-06-03 ENCOUNTER — HOSPITAL ENCOUNTER (OUTPATIENT)
Dept: VASCULAR LAB | Age: 86
Discharge: HOME | End: 2021-06-03
Payer: MEDICARE

## 2021-06-03 DIAGNOSIS — I70.223 ATHEROSCLEROSIS OF NATIVE ARTERIES OF EXTREMITIES WITH REST PAIN, BILATERAL LEGS (HCC): ICD-10-CM

## 2021-06-03 PROCEDURE — 93925 LOWER EXTREMITY STUDY: CPT

## 2021-06-08 ENCOUNTER — OFFICE VISIT (OUTPATIENT)
Dept: VASCULAR SURGERY | Age: 86
End: 2021-06-08
Payer: MEDICARE

## 2021-06-08 VITALS
DIASTOLIC BLOOD PRESSURE: 68 MMHG | HEIGHT: 60 IN | BODY MASS INDEX: 23.16 KG/M2 | SYSTOLIC BLOOD PRESSURE: 118 MMHG | WEIGHT: 118 LBS

## 2021-06-08 DIAGNOSIS — I70.223 ATHEROSCLEROSIS OF NATIVE ARTERIES OF EXTREMITIES WITH REST PAIN, BILATERAL LEGS (HCC): Primary | ICD-10-CM

## 2021-06-08 PROCEDURE — 1036F TOBACCO NON-USER: CPT | Performed by: SURGERY

## 2021-06-08 PROCEDURE — 1123F ACP DISCUSS/DSCN MKR DOCD: CPT | Performed by: SURGERY

## 2021-06-08 PROCEDURE — G8420 CALC BMI NORM PARAMETERS: HCPCS | Performed by: SURGERY

## 2021-06-08 PROCEDURE — 1090F PRES/ABSN URINE INCON ASSESS: CPT | Performed by: SURGERY

## 2021-06-08 PROCEDURE — 99213 OFFICE O/P EST LOW 20 MIN: CPT | Performed by: SURGERY

## 2021-06-08 PROCEDURE — 4040F PNEUMOC VAC/ADMIN/RCVD: CPT | Performed by: SURGERY

## 2021-06-08 PROCEDURE — G8427 DOCREV CUR MEDS BY ELIG CLIN: HCPCS | Performed by: SURGERY

## 2021-06-08 NOTE — PROGRESS NOTES
[facility])   Vascular Surgery Followup    Referring Provider:  Christiano Jett MD     No chief complaint on file. History of Present Illness:  80-year-old female here today for follow-up with known history of peripheral vascular disease. She underwent peripheral angiogram February 3, 2021 showing a widely patent iliofemoral and popliteal section however she had occlusion of her left anterior tibial and posterior tibial artery with a single-vessel peroneal runoff on the left side. Additionally she had a single-vessel peroneal runoff on the right side. She underwent repeat noninvasive studies prior to this 3-month follow-up and is here today to discuss the results. She continues to struggle with some numbness and discomfort in both feet. No new changes from previous visit    Past Medical History:   has a past medical history of COPD (chronic obstructive pulmonary disease) ([facility] [address] DDD (degenerative disc disease), Diabetes mellitus ([address] Hypertension, Macular degeneration, PVD (peripheral vascular disease) ([facility] [address] Reflux, and Thyroid disease. Surgical History:   has a past surgical history that includes sinus surgery; Tonsillectomy; Hysterectomy; Cholecystectomy; eye surgery; Colon surgery; pre-malignant / benign skin lesion excision (3/4/11); Cataract removal with implant (Right); Knee arthroscopy (8-20-15); Knee arthroscopy (Right, 8/20/2015); joint replacement (Right, 03-); Abdomen surgery; pr excis mouth mucosa/sub,no repair (Right, 10/31/2018); and Biopsy mouth lesion (Right, 6/17/2019). Social History:   reports that she has quit smoking. She has never used smokeless tobacco. She reports that she does not drink alcohol and does not use drugs. Family History:  family history includes Diabetes in her mother; Heart Disease in her father.      Home Medications:  Current Outpatient Medications   Medication Sig Dispense Refill    metoprolol (LOPRESSOR) 100 MG tablet Take 1 tablet by mouth 2 times daily 180 tablet 0    clopidogrel (PLAVIX) 75 MG tablet Take 1 tablet by mouth daily 90 tablet 0    apixaban (ELIQUIS) 2.5 MG TABS tablet Take 1 tablet by mouth 2 times daily 180 tablet 0    levothyroxine (SYNTHROID) 50 MCG tablet Take 1 tablet by mouth daily 90 tablet 0    pantoprazole (PROTONIX) 40 MG tablet Take 1 tablet by mouth daily 90 tablet 0    spironolactone (ALDACTONE) 25 MG tablet Take 1 tablet by mouth daily 90 tablet 0    albuterol sulfate HFA (PROVENTIL HFA) 108 (90 Base) MCG/ACT inhaler Inhale 2 puffs into the lungs every 6 hours as needed for Wheezing 3 Inhaler 1    Calcium Polycarbophil (FIBER) 625 MG TABS Take 1 tablet by mouth 2 times daily 60 tablet 5    Calcium Carbonate-Vitamin D (CALTRATE 600+D PO) Take 1 tablet by mouth 2 times daily.  Multiple Vitamin (MULTIVITAMIN PO) Take  by mouth daily. No current facility-administered medications for this visit. Allergies:  Cephalexin and Morphine     Review of Systems:   · Constitutional: there has been no unanticipated weight loss. There's been no change in energy level, sleep pattern, or activity level. · Eyes: No visual changes or diplopia. No scleral icterus. · ENT: No Headaches, hearing loss or vertigo. No mouth sores or sore throat. · Cardiovascular: Reviewed in HPI  · Respiratory: No cough or wheezing, no sputum production. No hematemesis. · Gastrointestinal: No abdominal pain, appetite loss, blood in stools. No change in bowel or bladder habits. · Genitourinary: No dysuria, trouble voiding, or hematuria. · Musculoskeletal:  No gait disturbance, weakness or joint complaints. · Integumentary: No rash or pruritis. · Neurological: No headache, diplopia, change in muscle strength, numbness or tingling. No change in gait, balance, coordination, mood, affect, memory, mentation, behavior. · Psychiatric: No anxiety, no depression. · Endocrine: No malaise, fatigue or temperature intolerance.  No excessive thirst, fluid intake, or urination. No tremor. · Hematologic/Lymphatic: No abnormal bruising or bleeding, blood clots or swollen lymph nodes. · Allergic/Immunologic: No nasal congestion or hives. Physical Examination:    There were no vitals filed for this visit. General appearance: alert, appears stated age, cooperative and no distress  Head: Normocephalic, without obvious abnormality, atraumatic  Neck: no adenopathy, no carotid bruit, no JVD, supple, symmetrical, trachea midline and thyroid: not enlarged, symmetric, no tenderness/mass/nodules  Lungs: clear to auscultation bilaterally  Heart: regular rate and rhythm, S1, S2 normal, no murmur, click, rub or gallop  Abdomen: soft, non-tender. Bowel sounds normal. No masses,  no organomegaly  Extremities: extremities normal, atraumatic, no cyanosis or edema    Pulses: Monophasic peroneal signal  MEDICAL DECISION MAKING/TESTING  I have reviewed the testing personally and my interpretation is below. Right Impression  Right GARETH: 0.74. This is consistent with moderate arterial insufficiency at  rest  Ultrasound images of the right lower extremity reveal mild diffuse plaque  formation throughout common femoral, superficial femoral ad popliteal  arteries. Elevated velocity of the deep femoral artery suggests a greater than 50%  stenosis. Imaging of the posterior tibial artery reveals occlusion of the proximal  segment with reconstitution of flow my collateralization at the mid segment. Imaging of the anterior tibial artery reveals occlusion of the mid and distal  segment. Biphasic flow is noted thru out the peroneal artery. Left Impression  Left GARETH: 0.33. This is consistent with critical arterial insufficiency at  rest.  Ultrasound images of the left lower extremity reveal mild diffuse plaque  formation throughout common femoral, superficial femoral and popliteal  arteries.   Imaging of the posterior tibial artery reveals occlusion of the artery. Imaging of the anterior tibial artery reveals occlusion of the mid segment  with reconstitution of flow distally by collateralization. Biphasic flow is noted thru out the peroneal artery.       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    Extremity atherosclerosis with intermittent claudication (HCC)    Controlled type 2 diabetes mellitus with diabetic nephropathy, without long-term current use of insulin (HCC)    Paroxysmal atrial fibrillation (HCC)    Thyroid nodule    Atherosclerosis of native arteries of extremities with rest pain, bilateral legs (HCC)    Spondylosis of lumbar spine    Benign neoplasm of minor salivary gland    Chronic kidney disease, stage III (moderate) (HCC)    Panlobular emphysema (HCC)    Chronic diastolic heart failure (HCC)    Atherosclerosis of native arteries of extremities with intermittent claudication, bilateral legs (HCC)    Chronic kidney disease (CKD), stage IV (severe) ([facility] [address]       Plan:  1. Atherosclerosis of native arteries of extremities with rest pain, bilateral legs [facility])  80year-old female with lower extremity discomfort that is likely multifactorial.  She clearly has evidence of arterial disease with single-vessel peroneal runoff into both feet which is what makes her GARETH abnormal.  She does have biphasic flow in both peroneal arteries. These findings on the duplex were reviewed with her in detail. No further vascular intervention necessary at this time. Reassurance provided. Plan for repeat duplex in 6 months to ensure no progression of any proximal inflow disease.  - VL DUP LOWER EXTREMITY ARTERIES BILATERAL;  Future        Thank you for allowing me to participate in the care of this individual.  Please do not hesitate to contact me with any questions. Simeon Pritchett M.D., FACS.  6/8/2021  2:14 PM

## 2021-06-17 ENCOUNTER — OFFICE VISIT (OUTPATIENT)
Dept: ENT CLINIC | Age: 86
End: 2021-06-17
Payer: MEDICARE

## 2021-06-17 VITALS — TEMPERATURE: 95.7 F | HEART RATE: 79 BPM | SYSTOLIC BLOOD PRESSURE: 172 MMHG | DIASTOLIC BLOOD PRESSURE: 73 MMHG

## 2021-06-17 DIAGNOSIS — D10.39: Primary | ICD-10-CM

## 2021-06-17 PROCEDURE — 1090F PRES/ABSN URINE INCON ASSESS: CPT | Performed by: OTOLARYNGOLOGY

## 2021-06-17 PROCEDURE — G8420 CALC BMI NORM PARAMETERS: HCPCS | Performed by: OTOLARYNGOLOGY

## 2021-06-17 PROCEDURE — 1123F ACP DISCUSS/DSCN MKR DOCD: CPT | Performed by: OTOLARYNGOLOGY

## 2021-06-17 PROCEDURE — G8427 DOCREV CUR MEDS BY ELIG CLIN: HCPCS | Performed by: OTOLARYNGOLOGY

## 2021-06-17 PROCEDURE — 99213 OFFICE O/P EST LOW 20 MIN: CPT | Performed by: OTOLARYNGOLOGY

## 2021-06-17 PROCEDURE — 4040F PNEUMOC VAC/ADMIN/RCVD: CPT | Performed by: OTOLARYNGOLOGY

## 2021-06-17 PROCEDURE — 1036F TOBACCO NON-USER: CPT | Performed by: OTOLARYNGOLOGY

## 2021-06-17 NOTE — PROGRESS NOTES
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 However, it is not giving her any pain or difficulty in eating or any appearance difficulty. She has had no fever and no weight loss problem. Currently, she is in no distress. Ear examination continues to reveal normal-appearing tympanic membranes and ear canals bilaterally. Nasal examination is entirely normal. The neck is free of any adenopathy, mass, thyroid enlargement. The area of buccal mucosal submucosal growth is slightly larger but not enough to be concerned and there is no tenderness or evidence of inflammation. I had a long discussion discussion with her relative to the benign nature of the lesion and her age. Given those factors, it appears to me to be more dangerous for her to have further surgical intervention which even though it could be accomplished under local anesthesia intraorally, would nonetheless present some degree of danger. At this time, the risk versus reward would indicate that surgery would not be of best option. I have explained this to her very carefully, she will return as needed.

## 2021-08-11 ENCOUNTER — HOSPITAL ENCOUNTER (OUTPATIENT)
Dept: GENERAL RADIOLOGY | Age: 86
Discharge: HOME | End: 2021-08-11
Payer: MEDICARE

## 2021-08-11 ENCOUNTER — HOSPITAL ENCOUNTER (OUTPATIENT)
Age: 86
Discharge: HOME | End: 2021-08-11
Payer: MEDICARE

## 2021-08-11 DIAGNOSIS — M47.9: ICD-10-CM

## 2021-08-11 PROCEDURE — 72110 X-RAY EXAM L-2 SPINE 4/>VWS: CPT

## 2021-08-24 ENCOUNTER — OFFICE VISIT (OUTPATIENT)
Dept: ORTHOPEDIC SURGERY | Age: 86
End: 2021-08-24
Payer: MEDICARE

## 2021-08-24 VITALS — WEIGHT: 120 LBS | HEIGHT: 60 IN | BODY MASS INDEX: 23.56 KG/M2

## 2021-08-24 DIAGNOSIS — Z96.651 HISTORY OF TOTAL RIGHT KNEE REPLACEMENT: Primary | ICD-10-CM

## 2021-08-24 PROCEDURE — 1036F TOBACCO NON-USER: CPT | Performed by: PHYSICIAN ASSISTANT

## 2021-08-24 PROCEDURE — G8420 CALC BMI NORM PARAMETERS: HCPCS | Performed by: PHYSICIAN ASSISTANT

## 2021-08-24 PROCEDURE — 4040F PNEUMOC VAC/ADMIN/RCVD: CPT | Performed by: PHYSICIAN ASSISTANT

## 2021-08-24 PROCEDURE — 1123F ACP DISCUSS/DSCN MKR DOCD: CPT | Performed by: PHYSICIAN ASSISTANT

## 2021-08-24 PROCEDURE — 1090F PRES/ABSN URINE INCON ASSESS: CPT | Performed by: PHYSICIAN ASSISTANT

## 2021-08-24 PROCEDURE — G8427 DOCREV CUR MEDS BY ELIG CLIN: HCPCS | Performed by: PHYSICIAN ASSISTANT

## 2021-08-24 PROCEDURE — 99213 OFFICE O/P EST LOW 20 MIN: CPT | Performed by: PHYSICIAN ASSISTANT

## 2021-08-24 NOTE — PROGRESS NOTES
Subjective:      Patient ID: Radha Zuniga is a 80 y.o.  female. Chief Complaint   Patient presents with    Knee Problem     Right TKA 3.4.2016        HPI:  Here for annual follow up visit. S/P right knee arthroplasty. The date of procedure- 3-4-2021. Surgeon: Payton Billingsley   Issues or complaints: none      Review of Systems:   A 14 point review of systems and history form completed by the patient has been reviewed. This form is scanned in the media tab of the patient's chart under today's date. Past Medical History:   Diagnosis Date    COPD (chronic obstructive pulmonary disease) ([address]     DDD (degenerative disc disease)     Diabetes mellitus ([address]     Hypertension     Macular degeneration     bilateral    PVD (peripheral vascular disease) ([address]     Reflux     Thyroid disease        Family History   Problem Relation Age of Onset    Diabetes Mother     Heart Disease Father        Past Surgical History:   Procedure Laterality Date    ABDOMEN SURGERY      BIOPSY MOUTH LESION Right 6/17/2019    EXCISE LESION ON RIGHT SIDE CHEEK performed by Sana Allen MD at [facility] IMPLANT Right     CHOLECYSTECTOMY      COLON SURGERY      small bowel polyps    EYE SURGERY      for macular deg    HYSTERECTOMY      JOINT REPLACEMENT Right 03-    knee    KNEE ARTHROSCOPY  8-20-15    right medial menisectomy loose body.      KNEE ARTHROSCOPY Right 8/20/2015    RIGHT KNEE ARTHROSCOPIC PARTIAL MENISECTOMY AND REMOVAL OF LOOSE BODY    RI EXCIS MOUTH MUCOSA/SUB,NO REPAIR Right 10/31/2018    EXCISION MASS RIGHT BUCCAL MUCOSA performed by Sana Allen MD at [id number] [place] [address] PRE-MALIGNANT / BENIGN SKIN LESION EXCISION  3/4/11    inside right cheek    SINUS SURGERY      TONSILLECTOMY         Social History     Occupational History    Not on file   Tobacco Use    Smoking status: Former Smoker    Smokeless tobacco: Never Used    Tobacco comment: quit [employer] Vaping Use    Vaping Use: Never used   Substance and Sexual Activity    Alcohol use: No    Drug use: No    Sexual activity: Not on file       Current Outpatient Medications   Medication Sig Dispense Refill    metoprolol (LOPRESSOR) 100 MG tablet Take 1 tablet by mouth 2 times daily 180 tablet 0    clopidogrel (PLAVIX) 75 MG tablet Take 1 tablet by mouth daily 90 tablet 0    apixaban (ELIQUIS) 2.5 MG TABS tablet Take 1 tablet by mouth 2 times daily 180 tablet 0    levothyroxine (SYNTHROID) 50 MCG tablet Take 1 tablet by mouth daily 90 tablet 0    pantoprazole (PROTONIX) 40 MG tablet Take 1 tablet by mouth daily 90 tablet 0    spironolactone (ALDACTONE) 25 MG tablet Take 1 tablet by mouth daily 90 tablet 0    tiZANidine (ZANAFLEX) 4 MG tablet Take 1 tablet by mouth nightly 90 tablet 0    traMADol (ULTRAM) 50 MG tablet Take 1 tablet by mouth every 4 hours as needed for Pain for up to 15 days. Intended supply: 15  days. Take lowest dose possible to manage pain 60 tablet 0    albuterol sulfate HFA (PROVENTIL HFA) 108 (90 Base) MCG/ACT inhaler Inhale 2 puffs into the lungs every 6 hours as needed for Wheezing 3 Inhaler 1    Calcium Polycarbophil (FIBER) 625 MG TABS Take 1 tablet by mouth 2 times daily 60 tablet 5    Calcium Carbonate-Vitamin D (CALTRATE 600+D PO) Take 1 tablet by mouth 2 times daily.  Multiple Vitamin (MULTIVITAMIN PO) Take  by mouth daily. No current facility-administered medications for this visit. Objective:   She is alert, oriented x 3, pleasant, well nourished, developed and in no acute distress. Ht 5' (1.524 m)   Wt 120 lb (54.4 kg)   BMI 23.44 kg/m²      Examination of the right knee: Inspection of the skin demonstrates a well-healed midline incision. Inspection of the soft tissues without significant swelling or erythema. The overall alignment of the knee is neutral.  There is minimal intra-articular effusion.   AROM     Extension 0     Flexion  125 There no pain associated with ROM testing. Pes anserine bursa non-tender to palpation. Patellar tendon non-tender to palpation. Quadriceps tendon non-tender to palpation. Collateral ligaments non-tender to palpation. Popliteal fossa non-tender to palpation. Retro patellar crepitus is not present. There is no instability with varus/valgus stress testing in full extension or 90 degrees of flexion. There is no instability with anterior drawer testing. Extensor Mechanism is intact. Examination of the lower extremities are intact with sensation to light touch. Motor testing  5/5 in all major motor groups of the lower extremities. SLR negative. Examination of the lower extremities shows intact perfusion to all extremities. No cyanosis. Digits are warm to touch, capillary refill is less than 2 seconds. There is no edema noted. Examination of the skin over both lower extremities reveals: The skin to be intact without lacerations or abrasions. No significant erythema. No rashes or skin lesions. X Rays: was performed in the office today:   AP Standing, Lateral and Sunrise Right Knee: There is a right cemented total knee arthroplasty present. The alignment is satisfactory. There are no signs of failure or loosening. Diagnosis:        ICD-10-CM    1. History of total right knee replacement  Z96.651 XR KNEE RIGHT (3 VIEWS)          Assessment/ Plan:     Assessment:    Clinically and radiographically stable right knee arthroplasty 5 years post op. Plan:  Medications- tylenol for pain. PT- HEP discussed for strengthening exercises and range of motion exercises. Further Imaging-         Follow up:   Call or return to clinic if these symptoms worsen or fail to improve as anticipated.

## 2021-09-15 ENCOUNTER — HOSPITAL ENCOUNTER (OUTPATIENT)
Age: 86
Discharge: HOME | End: 2021-09-15
Payer: MEDICARE

## 2021-09-15 DIAGNOSIS — N18.2 STAGE 2 CHRONIC KIDNEY DISEASE: ICD-10-CM

## 2021-09-15 LAB
ANION GAP SERPL CALCULATED.3IONS-SCNC: 10 MMOL/L (ref 3–16)
BILIRUBIN URINE: NEGATIVE
BLOOD, URINE: NEGATIVE
BUN BLDV-MCNC: 21 MG/DL (ref 7–20)
CALCIUM SERPL-MCNC: 9.1 MG/DL (ref 8.3–10.6)
CHLORIDE BLD-SCNC: 97 MMOL/L (ref 99–110)
CLARITY: CLEAR
CO2: 26 MMOL/L (ref 21–32)
COLOR: YELLOW
CREAT SERPL-MCNC: 2 MG/DL (ref 0.6–1.2)
EPITHELIAL CELLS, UA: 0 /HPF (ref 0–5)
GFR AFRICAN AMERICAN: 28
GFR NON-AFRICAN AMERICAN: 23
GLUCOSE BLD-MCNC: 104 MG/DL (ref 70–99)
GLUCOSE URINE: NEGATIVE MG/DL
HYALINE CASTS: 0 /LPF (ref 0–8)
KETONES, URINE: NEGATIVE MG/DL
LEUKOCYTE ESTERASE, URINE: NEGATIVE
MICROSCOPIC EXAMINATION: NORMAL
NITRITE, URINE: NEGATIVE
PH UA: 7 (ref 5–8)
POTASSIUM SERPL-SCNC: 5.5 MMOL/L (ref 3.5–5.1)
PROTEIN UA: NEGATIVE MG/DL
RBC UA: 0 /HPF (ref 0–4)
SODIUM BLD-SCNC: 133 MMOL/L (ref 136–145)
SPECIFIC GRAVITY UA: 1 (ref 1–1.03)
URINE TYPE: NORMAL
UROBILINOGEN, URINE: 0.2 E.U./DL
WBC UA: 0 /HPF (ref 0–5)

## 2021-09-15 PROCEDURE — 80048 BASIC METABOLIC PNL TOTAL CA: CPT

## 2021-09-15 PROCEDURE — 36415 COLL VENOUS BLD VENIPUNCTURE: CPT

## 2021-09-15 PROCEDURE — 81001 URINALYSIS AUTO W/SCOPE: CPT

## 2021-09-24 RX ORDER — GABAPENTIN 100 MG/1
100 CAPSULE ORAL 2 TIMES DAILY
Qty: 60 CAPSULE | Refills: 1 | Status: SHIPPED | OUTPATIENT
Start: 2021-09-24 | End: 2022-01-18

## 2021-12-15 ENCOUNTER — TELEPHONE (OUTPATIENT)
Dept: VASCULAR SURGERY | Age: 86
End: 2021-12-15

## 2021-12-15 NOTE — TELEPHONE ENCOUNTER
FYI - Patient called and stated that she had her eyes dilated yesterday and that is why she missed her appointment. She will call back to reschedule at a later time when she can see again.

## 2022-01-11 ENCOUNTER — HOSPITAL ENCOUNTER (OUTPATIENT)
Dept: MRI IMAGING | Age: 87
Discharge: HOME | End: 2022-01-11
Payer: MEDICARE

## 2022-01-11 DIAGNOSIS — G83.14 MONOPARESIS OF LOWER EXTREMITY AFFECTING LEFT NONDOMINANT SIDE (HCC): ICD-10-CM

## 2022-01-11 DIAGNOSIS — M54.17 LUMBOSACRAL RADICULOPATHY AT L4: ICD-10-CM

## 2022-01-11 PROCEDURE — 72148 MRI LUMBAR SPINE W/O DYE: CPT

## 2022-01-18 PROBLEM — S32.10XA FX SACRUM/COCCYX-CLOSED (HCC): Status: ACTIVE | Noted: 2022-01-18

## 2022-01-18 PROBLEM — S32.2XXA FX SACRUM/COCCYX-CLOSED (HCC): Status: ACTIVE | Noted: 2022-01-18

## 2022-01-18 PROBLEM — G83.14 MONOPARESIS OF LOWER EXTREMITY AFFECTING LEFT NONDOMINANT SIDE (HCC): Status: ACTIVE | Noted: 2022-01-18

## 2022-01-18 PROBLEM — M48.061 SPINAL STENOSIS OF LUMBAR REGION WITHOUT NEUROGENIC CLAUDICATION: Status: ACTIVE | Noted: 2022-01-18

## 2022-03-01 ENCOUNTER — PROCEDURE VISIT (OUTPATIENT)
Dept: VASCULAR SURGERY | Age: 87
End: 2022-03-01
Payer: MEDICARE

## 2022-03-01 DIAGNOSIS — I70.223 ATHEROSCLEROSIS OF NATIVE ARTERIES OF EXTREMITIES WITH REST PAIN, BILATERAL LEGS (HCC): ICD-10-CM

## 2022-03-01 PROCEDURE — 93925 LOWER EXTREMITY STUDY: CPT | Performed by: SURGERY

## 2022-03-10 ENCOUNTER — OFFICE VISIT (OUTPATIENT)
Dept: ENT CLINIC | Age: 87
End: 2022-03-10
Payer: MEDICARE

## 2022-03-10 DIAGNOSIS — D48.5: Primary | ICD-10-CM

## 2022-03-10 DIAGNOSIS — R22.0 BUCCAL MASS: ICD-10-CM

## 2022-03-10 PROCEDURE — 4040F PNEUMOC VAC/ADMIN/RCVD: CPT | Performed by: STUDENT IN AN ORGANIZED HEALTH CARE EDUCATION/TRAINING PROGRAM

## 2022-03-10 PROCEDURE — G8482 FLU IMMUNIZE ORDER/ADMIN: HCPCS | Performed by: STUDENT IN AN ORGANIZED HEALTH CARE EDUCATION/TRAINING PROGRAM

## 2022-03-10 PROCEDURE — G8427 DOCREV CUR MEDS BY ELIG CLIN: HCPCS | Performed by: STUDENT IN AN ORGANIZED HEALTH CARE EDUCATION/TRAINING PROGRAM

## 2022-03-10 PROCEDURE — G8420 CALC BMI NORM PARAMETERS: HCPCS | Performed by: STUDENT IN AN ORGANIZED HEALTH CARE EDUCATION/TRAINING PROGRAM

## 2022-03-10 PROCEDURE — 1036F TOBACCO NON-USER: CPT | Performed by: STUDENT IN AN ORGANIZED HEALTH CARE EDUCATION/TRAINING PROGRAM

## 2022-03-10 PROCEDURE — 1123F ACP DISCUSS/DSCN MKR DOCD: CPT | Performed by: STUDENT IN AN ORGANIZED HEALTH CARE EDUCATION/TRAINING PROGRAM

## 2022-03-10 PROCEDURE — 1090F PRES/ABSN URINE INCON ASSESS: CPT | Performed by: STUDENT IN AN ORGANIZED HEALTH CARE EDUCATION/TRAINING PROGRAM

## 2022-03-10 PROCEDURE — 99214 OFFICE O/P EST MOD 30 MIN: CPT | Performed by: STUDENT IN AN ORGANIZED HEALTH CARE EDUCATION/TRAINING PROGRAM

## 2022-03-10 NOTE — PROGRESS NOTES
[address] VISIT      Patient Name: Jo Davila  Medical Record Number:  [id number]  Primary Care Physician:  Leon Walter MD    ChiefComplaint     Chief Complaint   Patient presents with    Other     cyst growing inside her mouth had it taken out 4 times keeps coming back , in the gland       History of Present Illness     Jo Davila is an 80 y.o. 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 Shawna Ferraro on 6/17/2019. Noted to have incomplete excision upon pathological review at that time. Interval History:   She has had a total of 4 surgical excisions of right buccal mucosal mass, her first surgery was approximately 35 years ago. It feels like it has been growing over the last few months, essentially double tripling in size. Now causing some pain in her cheekbone. On eliquis and plavix. Past Medical History     Past Medical History:   Diagnosis Date    COPD (chronic obstructive pulmonary disease) ([address]     DDD (degenerative disc disease)     Diabetes mellitus ([address]     Hypertension     Macular degeneration     bilateral    PVD (peripheral vascular disease) ([address]     Reflux     Thyroid disease        Past Surgical History     Past Surgical History:   Procedure Laterality Date    ABDOMEN SURGERY      BIOPSY MOUTH LESION Right 6/17/2019    EXCISE LESION ON RIGHT SIDE CHEEK performed by Jero Mendoza MD at [address] IMPLANT Right     CHOLECYSTECTOMY      COLON SURGERY      small bowel polyps    EYE SURGERY      for macular deg    HYSTERECTOMY      JOINT REPLACEMENT Right 03-    knee    KNEE ARTHROSCOPY  8-20-15    right medial menisectomy loose body.      KNEE ARTHROSCOPY Right 8/20/2015    RIGHT KNEE ARTHROSCOPIC PARTIAL MENISECTOMY AND REMOVAL OF LOOSE BODY    CT EXCIS MOUTH MUCOSA/SUB,NO REPAIR Right 10/31/2018    EXCISION MASS RIGHT BUCCAL MUCOSA performed by Mary Chavez MD at [address] [address] PRE-MAL[place]NT / [address]  3/4/11    inside right cheek    SINUS SURGERY      TONSILLECTOMY         Family History     Family History   Problem Relation Age of Onset    Diabetes Mother     Heart Disease Father        Social History     Social History     Tobacco Use    Smoking status: Former Smoker    Smokeless tobacco: Never Used    Tobacco comment: quit [id number]   Vaping Use    Vaping Use: Never used   Substance Use Topics    Alcohol use: No    Drug use: No        Allergies     Allergies   Allergen Reactions    Cephalexin Hives    Morphine Other (See Comments)     Stops heart       Medications     Current Outpatient Medications   Medication Sig Dispense Refill    metoprolol (LOPRESSOR) 100 MG tablet Take 1 tablet by mouth 2 times daily 180 tablet 0    clopidogrel (PLAVIX) 75 MG tablet Take 1 tablet by mouth daily 90 tablet 0    apixaban (ELIQUIS) 2.5 MG TABS tablet Take 1 tablet by mouth 2 times daily 180 tablet 0    levothyroxine (SYNTHROID) 50 MCG tablet Take 1 tablet by mouth daily 90 tablet 0    pantoprazole (PROTONIX) 40 MG tablet Take 1 tablet by mouth daily 90 tablet 0    spironolactone (ALDACTONE) 25 MG tablet Take 1 tablet by mouth daily 90 tablet 0    gabapentin (NEURONTIN) 300 MG capsule Take 1 capsule by mouth 2 times daily for 90 days. Intended supply: 30 days 180 capsule 0    Calcium Polycarbophil (FIBER) 625 MG TABS Take 1 tablet by mouth 2 times daily 60 tablet 5    Calcium Carbonate-Vitamin D (CALTRATE 600+D PO) Take 1 tablet by mouth 2 times daily.  Multiple Vitamin (MULTIVITAMIN PO) Take  by mouth daily.  albuterol sulfate HFA (PROVENTIL HFA) 108 (90 Base) MCG/ACT inhaler Inhale 2 puffs into the lungs every 6 hours as needed for Wheezing 3 Inhaler 1     No current facility-administered medications for this visit.        Review of Systems     REVIEW OF SYSTEMS  The following systems were reviewed and revealed the following in addition to any already discussed in the HPI:    CONSTITUTIONAL: no weight loss, no fever, no night sweats, no chills  EYES: no vision changes, no blurry vision  EARS: no hearing loss, no otalgia  NOSE: no epistaxis, no rhinorrhea  THROAT: No voice changes, no sore throat, no dysphagia    PhysicalExam     There were no vitals filed for this visit. PHYSICAL EXAM  There were no vitals taken for this visit. GENERAL: No acute distress, alert and oriented, no hoarseness  EYES: EOMI, Anti-icteric  NOSE: On anterior rhinoscopy there is no epistaxis, nasal mucosa moist and normal appearing, no purulent drainage. EARS: Normal external appearance; on portable otomicroscopy:     -Ad: External auditory canal without stenosis, tympanic membrane clear, no middle ear effusions or retractions     -As: External auditory canal without stenosis, tympanic membrane clear, no middle ear effusions or retractions  FACE: HB 1/6 bilaterally, symmetric appearing, sensation equal bilaterally  ORAL CAVITY: There is a right buccal submucosal 2.5 cm firm mass that is semi-mobile. Overlying bucca mucosa with scarring. Palpation from the right cheek demonstrates mobility as well. No masses or lesions visualized or palpated, uvula is midline, moist mucous membranes  NECK: Normal range of motion, no thyromegaly, trachea is midline, no palpable lymphadenopathy or neck masses, no crepitus  CHEST: Normal respiratory effort, breathing comfortably, no retractions  SKIN: No rashes, normal appearing skin, no evidence of skin lesions/tumors  NEURO: Cranial Nerves 2, 3, 4, 5, 6, 7, 11, 12 intact bilaterally     I have performed a head and neck physical exam personally or was physically present during the key or critical portions of the service.       Data/Imaging Review     [facility]                                        [address]                                        [address] Ju[address] [facility]                                   Fax [phone]   [phone]   Department of Pathology   FINAL SURGICAL PATHOLOGY REPORT   Patient Name: Genevieve Tovar           Accession No:  [id number]    Age Sex:   1933    86 Y / F       Location:      [facility]   Account No:   [de-identified]                  Collected:     2019   Med Rec No:    [id number]                 Received:      2019   Attend Phys:   Annette Nicole MD          Completed:     2019   Perform Phys: Annette Nicole MD     FINAL DIAGNOSIS:     Right buccal mucosa, lesion, excision:      - Basal cell neoplasm, incompletely excised - see comment. COMMENT:  The previous excision of this lesion is noted ([facility]-;   10/31/2018).  The submitted right buccal mucosal excision shows   involvement of fibromuscular and adipose tissue by predominantly   well-circumscribed, variably sized nodules of basaloid epithelial cells.    The basaloid cells show mild cytologic atypia and scattered mitoses,   though this is not of particular diagnostic utility.  No evidence of   squamous differentiation or features of adenoid cystic carcinoma   (prominent cribriform pattern, perineural invasion) are seen.  Overall,   the findings are those of a basal cell neoplasm.  Distinction between a   basal cell adenoma and basal cell adenocarcinoma relies on the presence   of invasive growth, and these entities can coexist.  While the neoplasm   is predominantly nodular and circumscribed, scattered small foci of   basaloid nests are seen focally within the surrounding adipose tissue and   focally adjacent to a vascular channel.  No definitive lymphovascular   invasion or perineural invasion is seen.  The lesion is again   incompletely excised with lesional cells present at the margin, hindering   the assessment of peripheral invasion.  Overall, the submitted biopsy   shows a basal cell neoplasm with histologic and clinical features   (recurrence, focal infiltrating nests) that make it difficult to entirely   exclude the possibility of a basal cell adenocarcinoma (which is   associated with frequent local recurrence).  Close clinical follow-up is   recommended. These findings were discussed with Dr. Herberth Gutierrez at 12:15 PM on 6/19/19. This case was prospectively reviewed with two additional pathologists. Assessment and Plan     1. Basal cell neoplasm  2. Buccal mass  -She has had multiple excisions and recurrences of her right buccal mucosal mass. Pathology from last excision demonstrating basal cell neoplasm. Given the chronicity of the lesion is likely benign but locally recurring. She states that is causing her some pain and it is quite bothersome to her and would like it excised. We will plan for transoral excision. Description of the procedure as well as associated risk, benefits, alternatives discussed in detail with the patient. Discussed risk included but were not limited to infection, nerve damage, bleeding, damage to surrounding structures, damage to salivary gland duct, cosmetic deformity, hematoma formation, anesthesia risk. I expressed to the patient there is a high risk for recurrence of this lesion given multiple recurrences in the past.  Patient expressed understanding and all questions were answered consent was signed in the office.  -On Plavix and Eliquis, will need PCP and cardiac clearance preoperatively. Will need to be off of blood thinners anticoagulants prior to surgery per cardiology recommendations. Follow-Up     Return for post operative visit. Luci García 46  Department of Otolaryngology/Head and Neck Surgery  3/10/22    Medical Decision Making:   The following items were considered in medical decision making:  Independent review of images  Review / order clinical lab tests  Review / order radiology tests  Decision to obtain old records      This note was generated completely or in part utilizing Dragon dictation speech recognition software. Occasionally, words are mistranscribed and despite editing, the text may contain inaccuracies due to incorrect word recognition. If further clarification is needed please contact the office at [phone].

## 2022-03-24 ENCOUNTER — TELEPHONE (OUTPATIENT)
Dept: VASCULAR SURGERY | Age: 87
End: 2022-03-24

## 2022-03-24 DIAGNOSIS — I70.223 ATHEROSCLEROSIS OF NATIVE ARTERIES OF EXTREMITIES WITH REST PAIN, BILATERAL LEGS (HCC): Primary | ICD-10-CM

## 2022-03-24 NOTE — TELEPHONE ENCOUNTER
Discussed results of BLE duplex which shows stable moderate arterial disease in BLE. Patient reports her legs have been okay, she did have some left leg pain today but got on her bike to see if it would help. Has a sore on right foot between 1st and 2nd toe that she describes as a \"corn\". At this point recommend continuing with current medical regimen and increase exercise as tolerated. Informed patient to call if pain or wound worsens. Will plan for repeat BLE arterial duplex in 6 months with OV to follow.     Electronically signed by TREMAINE Yañez CNP on 3/24/2022 at 10:30 AM

## 2022-04-04 NOTE — PROGRESS NOTES
Name_______________________________________Printed:____________________  Date and time of uoogpcz___1-2-5452________5050_____________Hstybhp Time:_____0845s given regarding when and if a patient needs to stop oral intake prior to surgery varies. Follow the specific instructions you were given                  _x__Nothing to eat or to drink after Midnight the night before.                   ____Carbo loading or ERAS instructions will be given to select patients-if you have been given those instructions -please do the following                           The evening before your surgery after dinner before midnight drink 40 ounces of gatorade. If you are diabetic use sugar free. The morning of surgery drink 40 ounces of water. This needs to be finished 3 hours prior to your surgery start time. 2. Take the following pills with a small sip of water on the morning of surgery__metoprolol, levothyroxine, pantoprozole_________________________________________________                  Do not take blood pressure medications ending in pril or sartan the silva prior to surgery or the morning of surgery_   3. Aspirin, Ibuprofen, Advil, Naproxen, Vitamin E and other Anti-inflammatory products and supplements should be stopped for 5 -7days before surgery or as directed by your physician. 4. Check with your Doctor regarding stopping Plavix, Coumadin,Eliquis, Lovenox,Effient,Pradaxa,Xarelto, Fragmin or other blood thinners and follow their instructions. 5. Do not smoke, and do not drink any alcoholic beverages 24 hours prior to surgery. This includes NA Beer. Refrain from the usage of any recreational drugs. 6. You may brush your teeth and gargle the morning of surgery. DO NOT SWALLOW WATER   7. You MUST make arrangements for a responsible adult to stay on site while you are here and take you home after your surgery. You will not be allowed to leave alone or drive yourself home.   It is strongly suggested someone stay with you the first 24 hrs. Your surgery will be cancelled if you do not have a ride home. 8. A parent/legal guardian must accompany a child scheduled for surgery and plan to stay at the hospital until the child is discharged. Please do not bring other children with you. 9. Please wear simple, loose fitting clothing to the hospital.  Kavya Alvarez not bring valuables (money, credit cards, checkbooks, etc.) Do not wear any makeup (including no eye makeup) or nail polish on your fingers or toes. 10. DO NOT wear any jewelry or piercings on day of surgery. All body piercing jewelry must be removed. 11. If you have _x__dentures, they will be removed before going to the OR; we will provide you a container. If you wear ___contact lenses or ___glasses, they will be removed; please bring a case for them. 12. Please see your family doctor/pediatrician for a history & physical and/or concerning medications. Bring any test results/reports from your physician's office. PCP__________________Phone___________H&P Appt. Date________             13 If you  have a Living Will and Durable Power of  for Healthcare, please bring in a copy. 15. Notify your Surgeon if you develop any illness between now and surgery  time, cough, cold, fever, sore throat, nausea, vomiting, etc.  Please notify your surgeon if you experience dizziness, shortness of breath or blurred vision between now & the time of your surgery             15. DO NOT shave your operative site 96 hours prior to surgery. For face & neck surgery, men may use an electric razor 48 hours prior to surgery. 16. Shower the night before or morning of surgery using an antibacterial soap or as you have been instructed. 17. To provide excellent care visitors will be limited to one in the room at any given time. 18.  Please bring picture ID and insurance card.              19.  Visit our web site for additional information:  [email or web address]              20.During flu season no children under the age of 15 are permitted in the hospital for the safety of all patients. 21. If you take a long acting insulin in the evening only  take half of your usual  dose the night  before your procedure              22. If you use a c-pap please bring DOS if staying overnight,             23.For your convenience [facility] has a pharmacy on site to fill your prescriptions. 24. If you use oxygen and have a portable tank please bring it  with you the DOS             25. Bring a complete list of all your medications with name and dose include any supplements. 26. Other__________________________________________   *Please call pre admission testing if you any further questions   [address] [address] [place]  [phone]   [address] [place] [address]       VISITOR POLICY(subject to change)    Current policy is 2 visitors per patient. No children. A mask is required. Visiting hours are 8a-8p. Overnight visitors will be at the discretion of the nurse. All above information reviewed with patient in person or by phone. Patient verbalizes understanding. All questions and concerns addressed.                                                                                                  Patient/Rep_patient___________________                                                                                                                                    PRE OP INSTRUCTIONS

## 2022-04-06 ENCOUNTER — ANESTHESIA EVENT (OUTPATIENT)
Dept: OPERATING ROOM | Age: 87
End: 2022-04-06
Payer: MEDICARE

## 2022-04-06 ENCOUNTER — ANESTHESIA (OUTPATIENT)
Dept: OPERATING ROOM | Age: 87
End: 2022-04-06
Payer: MEDICARE

## 2022-04-06 ENCOUNTER — HOSPITAL ENCOUNTER (OUTPATIENT)
Age: 87
Setting detail: OUTPATIENT SURGERY
Discharge: HOME | End: 2022-04-06
Attending: STUDENT IN AN ORGANIZED HEALTH CARE EDUCATION/TRAINING PROGRAM | Admitting: STUDENT IN AN ORGANIZED HEALTH CARE EDUCATION/TRAINING PROGRAM
Payer: MEDICARE

## 2022-04-06 VITALS
RESPIRATION RATE: 20 BRPM | HEART RATE: 73 BPM | SYSTOLIC BLOOD PRESSURE: 142 MMHG | BODY MASS INDEX: 22.97 KG/M2 | OXYGEN SATURATION: 98 % | HEIGHT: 60 IN | TEMPERATURE: 96.9 F | WEIGHT: 117 LBS | DIASTOLIC BLOOD PRESSURE: 58 MMHG

## 2022-04-06 VITALS
RESPIRATION RATE: 8 BRPM | OXYGEN SATURATION: 99 % | SYSTOLIC BLOOD PRESSURE: 91 MMHG | TEMPERATURE: 96.6 F | DIASTOLIC BLOOD PRESSURE: 53 MMHG

## 2022-04-06 LAB
ANION GAP SERPL CALCULATED.3IONS-SCNC: 12 MMOL/L (ref 3–16)
BUN BLDV-MCNC: 20 MG/DL (ref 7–20)
CALCIUM SERPL-MCNC: 9.2 MG/DL (ref 8.3–10.6)
CHLORIDE BLD-SCNC: 104 MMOL/L (ref 99–110)
CO2: 24 MMOL/L (ref 21–32)
CREAT SERPL-MCNC: 1.7 MG/DL (ref 0.6–1.2)
GFR AFRICAN AMERICAN: 34
GFR NON-AFRICAN AMERICAN: 28
GLUCOSE BLD-MCNC: 109 MG/DL (ref 70–99)
GLUCOSE BLD-MCNC: 113 MG/DL (ref 70–99)
HCT VFR BLD CALC: 40.2 % (ref 36–48)
HEMOGLOBIN: 13.5 G/DL (ref 12–16)
MCH RBC QN AUTO: 29 PG (ref 26–34)
MCHC RBC AUTO-ENTMCNC: 33.5 G/DL (ref 31–36)
MCV RBC AUTO: 86.5 FL (ref 80–100)
PDW BLD-RTO: 14.9 % (ref 12.4–15.4)
PERFORMED ON: ABNORMAL
PLATELET # BLD: 262 K/UL (ref 135–450)
PMV BLD AUTO: 8 FL (ref 5–10.5)
POTASSIUM SERPL-SCNC: 5 MMOL/L (ref 3.5–5.1)
RBC # BLD: 4.64 M/UL (ref 4–5.2)
SODIUM BLD-SCNC: 140 MMOL/L (ref 136–145)
WBC # BLD: 5.7 K/UL (ref 4–11)

## 2022-04-06 PROCEDURE — 2580000003 HC RX 258: Performed by: ANESTHESIOLOGY

## 2022-04-06 PROCEDURE — 3600000002 HC SURGERY LEVEL 2 BASE: Performed by: STUDENT IN AN ORGANIZED HEALTH CARE EDUCATION/TRAINING PROGRAM

## 2022-04-06 PROCEDURE — 80048 BASIC METABOLIC PNL TOTAL CA: CPT

## 2022-04-06 PROCEDURE — 7100000001 HC PACU RECOVERY - ADDTL 15 MIN: Performed by: STUDENT IN AN ORGANIZED HEALTH CARE EDUCATION/TRAINING PROGRAM

## 2022-04-06 PROCEDURE — 7100000010 HC PHASE II RECOVERY - FIRST 15 MIN: Performed by: STUDENT IN AN ORGANIZED HEALTH CARE EDUCATION/TRAINING PROGRAM

## 2022-04-06 PROCEDURE — 40816 EXCISION OF MOUTH LESION: CPT | Performed by: STUDENT IN AN ORGANIZED HEALTH CARE EDUCATION/TRAINING PROGRAM

## 2022-04-06 PROCEDURE — 3600000012 HC SURGERY LEVEL 2 ADDTL 15MIN: Performed by: STUDENT IN AN ORGANIZED HEALTH CARE EDUCATION/TRAINING PROGRAM

## 2022-04-06 PROCEDURE — 85027 COMPLETE CBC AUTOMATED: CPT

## 2022-04-06 PROCEDURE — 88341 IMHCHEM/IMCYTCHM EA ADD ANTB: CPT

## 2022-04-06 PROCEDURE — 2500000003 HC RX 250 WO HCPCS: Performed by: STUDENT IN AN ORGANIZED HEALTH CARE EDUCATION/TRAINING PROGRAM

## 2022-04-06 PROCEDURE — 7100000011 HC PHASE II RECOVERY - ADDTL 15 MIN: Performed by: STUDENT IN AN ORGANIZED HEALTH CARE EDUCATION/TRAINING PROGRAM

## 2022-04-06 PROCEDURE — 3700000000 HC ANESTHESIA ATTENDED CARE: Performed by: STUDENT IN AN ORGANIZED HEALTH CARE EDUCATION/TRAINING PROGRAM

## 2022-04-06 PROCEDURE — 88342 IMHCHEM/IMCYTCHM 1ST ANTB: CPT

## 2022-04-06 PROCEDURE — 2500000003 HC RX 250 WO HCPCS: Performed by: REGISTERED NURSE

## 2022-04-06 PROCEDURE — 88304 TISSUE EXAM BY PATHOLOGIST: CPT

## 2022-04-06 PROCEDURE — 6360000002 HC RX W HCPCS: Performed by: REGISTERED NURSE

## 2022-04-06 PROCEDURE — 3700000001 HC ADD 15 MINUTES (ANESTHESIA): Performed by: STUDENT IN AN ORGANIZED HEALTH CARE EDUCATION/TRAINING PROGRAM

## 2022-04-06 PROCEDURE — 7100000000 HC PACU RECOVERY - FIRST 15 MIN: Performed by: STUDENT IN AN ORGANIZED HEALTH CARE EDUCATION/TRAINING PROGRAM

## 2022-04-06 PROCEDURE — 2709999900 HC NON-CHARGEABLE SUPPLY: Performed by: STUDENT IN AN ORGANIZED HEALTH CARE EDUCATION/TRAINING PROGRAM

## 2022-04-06 PROCEDURE — 40831 REPAIR MOUTH LACERATION: CPT | Performed by: STUDENT IN AN ORGANIZED HEALTH CARE EDUCATION/TRAINING PROGRAM

## 2022-04-06 PROCEDURE — 2580000003 HC RX 258: Performed by: REGISTERED NURSE

## 2022-04-06 RX ORDER — DEXAMETHASONE SODIUM PHOSPHATE 4 MG/ML
INJECTION, SOLUTION INTRA-ARTICULAR; INTRALESIONAL; INTRAMUSCULAR; INTRAVENOUS; SOFT TISSUE PRN
Status: DISCONTINUED | OUTPATIENT
Start: 2022-04-06 | End: 2022-04-06 | Stop reason: SDUPTHER

## 2022-04-06 RX ORDER — SODIUM CHLORIDE 0.9 % (FLUSH) 0.9 %
5-40 SYRINGE (ML) INJECTION PRN
Status: DISCONTINUED | OUTPATIENT
Start: 2022-04-06 | End: 2022-04-06 | Stop reason: HOSPADM

## 2022-04-06 RX ORDER — LIDOCAINE HYDROCHLORIDE AND EPINEPHRINE 10; 10 MG/ML; UG/ML
INJECTION, SOLUTION INFILTRATION; PERINEURAL
Status: COMPLETED | OUTPATIENT
Start: 2022-04-06 | End: 2022-04-06

## 2022-04-06 RX ORDER — PROPOFOL 10 MG/ML
INJECTION, EMULSION INTRAVENOUS PRN
Status: DISCONTINUED | OUTPATIENT
Start: 2022-04-06 | End: 2022-04-06 | Stop reason: SDUPTHER

## 2022-04-06 RX ORDER — SUCCINYLCHOLINE/SOD CL,ISO/PF 200MG/10ML
SYRINGE (ML) INTRAVENOUS PRN
Status: DISCONTINUED | OUTPATIENT
Start: 2022-04-06 | End: 2022-04-06 | Stop reason: SDUPTHER

## 2022-04-06 RX ORDER — FENTANYL CITRATE 50 UG/ML
25 INJECTION, SOLUTION INTRAMUSCULAR; INTRAVENOUS EVERY 5 MIN PRN
Status: DISCONTINUED | OUTPATIENT
Start: 2022-04-06 | End: 2022-04-06 | Stop reason: HOSPADM

## 2022-04-06 RX ORDER — ONDANSETRON 2 MG/ML
INJECTION INTRAMUSCULAR; INTRAVENOUS PRN
Status: DISCONTINUED | OUTPATIENT
Start: 2022-04-06 | End: 2022-04-06 | Stop reason: SDUPTHER

## 2022-04-06 RX ORDER — SODIUM CHLORIDE 9 MG/ML
INJECTION, SOLUTION INTRAVENOUS CONTINUOUS PRN
Status: DISCONTINUED | OUTPATIENT
Start: 2022-04-06 | End: 2022-04-06 | Stop reason: SDUPTHER

## 2022-04-06 RX ORDER — FENTANYL CITRATE 50 UG/ML
INJECTION, SOLUTION INTRAMUSCULAR; INTRAVENOUS PRN
Status: DISCONTINUED | OUTPATIENT
Start: 2022-04-06 | End: 2022-04-06 | Stop reason: SDUPTHER

## 2022-04-06 RX ORDER — PHENYLEPHRINE HCL IN 0.9% NACL 1 MG/10 ML
SYRINGE (ML) INTRAVENOUS PRN
Status: DISCONTINUED | OUTPATIENT
Start: 2022-04-06 | End: 2022-04-06 | Stop reason: SDUPTHER

## 2022-04-06 RX ORDER — MEPERIDINE HYDROCHLORIDE 25 MG/ML
12.5 INJECTION INTRAMUSCULAR; INTRAVENOUS; SUBCUTANEOUS EVERY 5 MIN PRN
Status: DISCONTINUED | OUTPATIENT
Start: 2022-04-06 | End: 2022-04-06 | Stop reason: HOSPADM

## 2022-04-06 RX ORDER — ONDANSETRON 2 MG/ML
4 INJECTION INTRAMUSCULAR; INTRAVENOUS
Status: DISCONTINUED | OUTPATIENT
Start: 2022-04-06 | End: 2022-04-06 | Stop reason: HOSPADM

## 2022-04-06 RX ORDER — SODIUM CHLORIDE 0.9 % (FLUSH) 0.9 %
5-40 SYRINGE (ML) INJECTION EVERY 12 HOURS SCHEDULED
Status: DISCONTINUED | OUTPATIENT
Start: 2022-04-06 | End: 2022-04-06 | Stop reason: HOSPADM

## 2022-04-06 RX ORDER — LIDOCAINE HYDROCHLORIDE 20 MG/ML
INJECTION, SOLUTION EPIDURAL; INFILTRATION; INTRACAUDAL; PERINEURAL PRN
Status: DISCONTINUED | OUTPATIENT
Start: 2022-04-06 | End: 2022-04-06 | Stop reason: SDUPTHER

## 2022-04-06 RX ORDER — GLYCOPYRROLATE 1 MG/5 ML
SYRINGE (ML) INTRAVENOUS PRN
Status: DISCONTINUED | OUTPATIENT
Start: 2022-04-06 | End: 2022-04-06 | Stop reason: SDUPTHER

## 2022-04-06 RX ORDER — SODIUM CHLORIDE 9 MG/ML
INJECTION, SOLUTION INTRAVENOUS CONTINUOUS
Status: DISCONTINUED | OUTPATIENT
Start: 2022-04-06 | End: 2022-04-06 | Stop reason: HOSPADM

## 2022-04-06 RX ORDER — SODIUM CHLORIDE 9 MG/ML
INJECTION, SOLUTION INTRAVENOUS PRN
Status: DISCONTINUED | OUTPATIENT
Start: 2022-04-06 | End: 2022-04-06 | Stop reason: HOSPADM

## 2022-04-06 RX ADMIN — LIDOCAINE HYDROCHLORIDE 40 MG: 20 INJECTION, SOLUTION EPIDURAL; INFILTRATION; INTRACAUDAL; PERINEURAL at 10:28

## 2022-04-06 RX ADMIN — PROPOFOL 30 MG: 10 INJECTION, EMULSION INTRAVENOUS at 10:29

## 2022-04-06 RX ADMIN — ONDANSETRON 4 MG: 2 INJECTION INTRAMUSCULAR; INTRAVENOUS at 10:35

## 2022-04-06 RX ADMIN — Medication 100 MG: at 10:29

## 2022-04-06 RX ADMIN — Medication 100 MCG: at 10:54

## 2022-04-06 RX ADMIN — Medication 100 MCG: at 10:50

## 2022-04-06 RX ADMIN — Medication 100 MCG: at 11:03

## 2022-04-06 RX ADMIN — SODIUM CHLORIDE: 9 INJECTION, SOLUTION INTRAVENOUS at 10:24

## 2022-04-06 RX ADMIN — Medication 100 MCG: at 11:17

## 2022-04-06 RX ADMIN — Medication 100 MCG: at 10:33

## 2022-04-06 RX ADMIN — FENTANYL CITRATE 50 MCG: 50 INJECTION, SOLUTION INTRAMUSCULAR; INTRAVENOUS at 10:28

## 2022-04-06 RX ADMIN — Medication 100 MCG: at 11:42

## 2022-04-06 RX ADMIN — Medication 100 MCG: at 10:45

## 2022-04-06 RX ADMIN — PROPOFOL 70 MG: 10 INJECTION, EMULSION INTRAVENOUS at 10:28

## 2022-04-06 RX ADMIN — Medication 100 MCG: at 11:31

## 2022-04-06 RX ADMIN — SODIUM CHLORIDE: 9 INJECTION, SOLUTION INTRAVENOUS at 09:55

## 2022-04-06 RX ADMIN — Medication 0.2 MG: at 10:43

## 2022-04-06 RX ADMIN — Medication 100 MCG: at 11:10

## 2022-04-06 RX ADMIN — DEXAMETHASONE SODIUM PHOSPHATE 4 MG: 4 INJECTION, SOLUTION INTRAMUSCULAR; INTRAVENOUS at 10:35

## 2022-04-06 RX ADMIN — FENTANYL CITRATE 25 MCG: 50 INJECTION, SOLUTION INTRAMUSCULAR; INTRAVENOUS at 11:29

## 2022-04-06 RX ADMIN — FENTANYL CITRATE 25 MCG: 50 INJECTION, SOLUTION INTRAMUSCULAR; INTRAVENOUS at 11:44

## 2022-04-06 RX ADMIN — Medication 100 MCG: at 10:35

## 2022-04-06 ASSESSMENT — PULMONARY FUNCTION TESTS
PIF_VALUE: 18
PIF_VALUE: 18
PIF_VALUE: 17
PIF_VALUE: 18
PIF_VALUE: 0
PIF_VALUE: 19
PIF_VALUE: 18
PIF_VALUE: 18
PIF_VALUE: 2
PIF_VALUE: 18
PIF_VALUE: 18
PIF_VALUE: 17
PIF_VALUE: 18
PIF_VALUE: 0
PIF_VALUE: 17
PIF_VALUE: 18
PIF_VALUE: 1
PIF_VALUE: 18
PIF_VALUE: 18
PIF_VALUE: 17
PIF_VALUE: 18
PIF_VALUE: 16
PIF_VALUE: 26
PIF_VALUE: 17
PIF_VALUE: 17
PIF_VALUE: 16
PIF_VALUE: 18
PIF_VALUE: 19
PIF_VALUE: 18
PIF_VALUE: 17
PIF_VALUE: 1
PIF_VALUE: 18
PIF_VALUE: 17
PIF_VALUE: 18
PIF_VALUE: 18
PIF_VALUE: 0
PIF_VALUE: 17
PIF_VALUE: 17
PIF_VALUE: 18
PIF_VALUE: 3
PIF_VALUE: 19
PIF_VALUE: 18
PIF_VALUE: 17
PIF_VALUE: 10
PIF_VALUE: 1
PIF_VALUE: 17
PIF_VALUE: 1
PIF_VALUE: 17
PIF_VALUE: 18
PIF_VALUE: 17
PIF_VALUE: 19
PIF_VALUE: 17
PIF_VALUE: 18
PIF_VALUE: 12
PIF_VALUE: 17
PIF_VALUE: 18
PIF_VALUE: 17
PIF_VALUE: 18
PIF_VALUE: 17
PIF_VALUE: 0
PIF_VALUE: 19
PIF_VALUE: 18
PIF_VALUE: 13
PIF_VALUE: 17
PIF_VALUE: 3
PIF_VALUE: 18
PIF_VALUE: 17
PIF_VALUE: 18
PIF_VALUE: 18
PIF_VALUE: 17
PIF_VALUE: 18
PIF_VALUE: 1
PIF_VALUE: 1
PIF_VALUE: 18
PIF_VALUE: 18
PIF_VALUE: 1
PIF_VALUE: 0
PIF_VALUE: 17
PIF_VALUE: 18

## 2022-04-06 ASSESSMENT — PAIN - FUNCTIONAL ASSESSMENT
PAIN_FUNCTIONAL_ASSESSMENT: ACTIVITIES ARE NOT PREVENTED
PAIN_FUNCTIONAL_ASSESSMENT: 0-10

## 2022-04-06 ASSESSMENT — PAIN DESCRIPTION - DESCRIPTORS: DESCRIPTORS: SORE

## 2022-04-06 NOTE — PROGRESS NOTES
Pt awake and alert at this time. Pt on RA, and VSS. Pt denies pain and nausea, tolerating PO. Skin warm to face. Pt meets criteria to be discharged from Phase 1.

## 2022-04-06 NOTE — PROGRESS NOTES
Discharge instructions review with patient and pt daughter. All home medications have been reviewed, pt v/u. Pt discharged via wheelchair. Pt discharged with instructions prescriptions and all belongings. Daughter taking stable pt home.

## 2022-04-06 NOTE — ANESTHESIA POSTPROCEDURE EVALUATION
Department of Anesthesiology  Postprocedure Note    Patient: Iva Garza  MRN: [id number]  YOB: 1933  Date of evaluation: 4/6/2022  Time:  12:18 PM     Procedure Summary     Date: 04/06/22 Room / Location: [address]    Anesthesia Start: 1024 Anesthesia Stop: 8036    Procedure: EXCISION RIGHT BUCCAL [address] ([address], [address], [address]) (Right Mouth) Diagnosis:       (D48.5  BASAL CELL NEOPLASM)      (R22.0  BUCCFAL MASS)    Surgeons: Eric Bonilla DO Responsible Provider: Mao Lamb MD    Anesthesia Type: general ASA Status: 3          Anesthesia Type: general    Demetrio Phase I: Demetrio Score: 8    Demetrio Phase II:      Last vitals: Reviewed and per EMR flowsheets.        Anesthesia Post Evaluation    Patient location during evaluation: PACU  Patient participation: complete - patient participated  Level of consciousness: awake  Airway patency: patent  Nausea & Vomiting: no vomiting and no nausea  Complications: no  Cardiovascular status: hemodynamically stable  Respiratory status: acceptable  Hydration status: stable  Multimodal analgesia pain management approach

## 2022-04-06 NOTE — BRIEF OP NOTE
Brief Postoperative Note      Patient: Moni Birmingham  YOB: 1933  MRN: [id number]    Date of Procedure: 4/6/2022    Pre-Op Diagnosis: D48.5  BASAL CELL NEOPLASM  R22.0  RIGHT BUCCAL SUBMUCOSAL MASS    Post-Op Diagnosis: Same       Procedure(s):  EXCISION RIGHT BUCCAL SUBMUCOSAL MASS (83635, 07413, 50590)    Surgeon(s):  Nisha Sen DO    Assistant:  * No surgical staff found *    Anesthesia: General    Estimated Blood Loss (mL): 15 ml    Complications: None    Specimens:   * No specimens in log *    Implants:  * No implants in log *      Drains: * No LDAs found *    Findings: 4 cm long right buccal mass    Electronically signed by Nisha Sen DO on 4/6/2022 at 11:54 AM

## 2022-04-06 NOTE — H&P
Date of Surgery Update:  Sonu Husain was seen, history and physical examination reviewed, and patient examined by me today. There have been no significant clinical changes since the completion of the previous history and physical performed on 3/22/22. The risk, benefits, and alternatives of the proposed procedure have been explained to the patient (or appropriate guardian) and understanding verbalized. All questions answered. Patient wishes to proceed.     Electronically signed by: Porter Moss DO,4/6/2022,10:16 AM

## 2022-04-06 NOTE — PROGRESS NOTES
Pt arrived from OR to PACU bay 4. Report received from OR staff. Pt asleep, easily arousable to voice. Pt arrives with simple mask in place, infusing 4L oxygen, vitals as charted.

## 2022-04-06 NOTE — ANESTHESIA PRE PROCEDURE
[facility] Department of Anesthesiology  Pre-Anesthesia Evaluation/Consultation       Name:  Radha Zuniga  : 3/25/1933  Age:  80 y.o. MRN:  [id number]  Date: 2022           Surgeon: Surgeon(s):  Leisa Villasenor DO    Procedure: Procedure(s):  EXCISE LESION ON RIGHT SIDE CHEEK     Allergies   Allergen Reactions    Cephalexin Hives    Morphine Other (See Comments)     Stops heart     Patient Active Problem List   Diagnosis    Essential hypertension    Osteopenia    Cervical radiculopathy    Thoracic back pain    Gastroesophageal reflux disease without esophagitis    Acquired hypothyroidism    PVD (peripheral vascular disease) ([address]    History of total right knee replacement-3.     Extremity atherosclerosis with intermittent claudication (HCC)    Controlled type 2 diabetes mellitus with diabetic nephropathy, without long-term current use of insulin (HCC)    Paroxysmal atrial fibrillation (HCC)    Thyroid nodule    Atherosclerosis of native arteries of extremities with rest pain, bilateral legs (HCC)    Spondylosis of lumbar spine    Benign neoplasm of minor salivary gland    Chronic kidney disease, stage III (moderate) (HCC)    Panlobular emphysema (HCC)    Chronic diastolic heart failure (HCC)    Atherosclerosis of native arteries of extremities with intermittent claudication, bilateral legs (HCC)    Chronic kidney disease (CKD), stage IV (severe) (HCC)    Monoparesis of lower extremity affecting left nondominant side ([address]    Spinal stenosis of lumbar region without neurogenic claudication    Fx sacrum/coccyx-closed [facility])     Past Medical History:   Diagnosis Date    A-fib ([address]     COPD (chronic obstructive pulmonary disease) ([address]     DDD (degenerative disc disease)     Diabetes mellitus ([address]     Hypertension     Macular degeneration     bilateral    PVD (peripheral vascular disease) ([address]     Reflux     Thyroid disease      Past Surgical History:   Procedure Laterality Date    ABDOMEN SURGERY      BIOPSY MOUTH LESION Right 6/17/2019    EXCISE LESION ON RIGHT SIDE CHEEK performed by Jeaneth Abreu MD at [address] Right     CHOLECYSTECTOMY      COLON SURGERY      small bowel polyps    EYE SURGERY      for macular deg    HYSTERECTOMY      JOINT REPLACEMENT Right 03-    knee    KNEE ARTHROSCOPY  8-20-15    right medial menisectomy loose body.      KNEE ARTHROSCOPY Right 8/20/2015    RIGHT KNEE ARTHROSCOPIC PARTIAL MENISECTOMY AND REMOVAL OF LOOSE BODY    LA EXCIS MOUTH MUCOSA/SUB,NO REPAIR Right 10/31/2018    EXCISION MASS RIGHT BUCCAL MUCOSA performed by Jeaneth Abreu MD at [address] [address] PRE-MALIGNANT / [address]  3/4/11    inside right cheek    SINUS SURGERY      TONSILLECTOMY       Social History     Tobacco Use    Smoking status: Former Smoker    Smokeless tobacco: Never Used    Tobacco comment: quit [id number]   Vaping Use    Vaping Use: Never used   Substance Use Topics    Alcohol use: No    Drug use: No     Medications  Current Facility-Administered Medications on File Prior to Visit   Medication Dose Route Frequency Provider Last Rate Last Admin    0.9 % sodium chloride infusion   IntraVENous Continuous Nhi Harris MD         Current Outpatient Medications on File Prior to Visit   Medication Sig Dispense Refill    metoprolol (LOPRESSOR) 100 MG tablet Take 1 tablet by mouth 2 times daily 180 tablet 0    clopidogrel (PLAVIX) 75 MG tablet Take 1 tablet by mouth daily 90 tablet 0    apixaban (ELIQUIS) 2.5 MG TABS tablet Take 1 tablet by mouth 2 times daily 180 tablet 0    levothyroxine (SYNTHROID) 50 MCG tablet Take 1 tablet by mouth daily 90 tablet 0    pantoprazole (PROTONIX) 40 MG tablet Take 1 tablet by mouth daily 90 tablet 0    spironolactone (ALDACTONE) 25 MG tablet Take 1 tablet by mouth daily 90 tablet 0    gabapentin (NEURONTIN) 300 MG capsule Take 1 capsule by mouth 2 times daily for 90 days. Intended supply: 30 days 180 capsule 0    albuterol sulfate HFA (PROVENTIL HFA) 108 (90 Base) MCG/ACT inhaler Inhale 2 puffs into the lungs every 6 hours as needed for Wheezing 3 Inhaler 1    Calcium Polycarbophil (FIBER) 625 MG TABS Take 1 tablet by mouth 2 times daily 60 tablet 5    Calcium Carbonate-Vitamin D (CALTRATE 600+D PO) Take 1 tablet by mouth 2 times daily.  Multiple Vitamin (MULTIVITAMIN PO) Take  by mouth daily. No current facility-administered medications for this visit. No current outpatient medications on file. Facility-Administered Medications Ordered in Other Visits   Medication Dose Route Frequency Provider Last Rate Last Admin    0.9 % sodium chloride infusion   IntraVENous Continuous Marjorie Gonzalez MD         Vital Signs (Current)   There were no vitals filed for this visit. Vital Signs Statistics (for past 48 hrs)     Temp  Av.9 °F (36.6 °C)  Min: 97.9 °F (36.6 °C)   Min taken time: 22  Max: 97.9 °F (36.6 °C)   Max taken time: 2235  Pulse  Av  Min: 79   Min taken time: 22 0935  Max: 79   Max taken time: 22 0935  Resp  Av  Min: 12   Min taken time: 22  Max: 12   Max taken time: 22 0935  BP  Min: 156/56   Min taken time: 2235  Max: 156/56   Max taken time: 22 0935  SpO2  Av %  Min: 100 %   Min taken time: 22  Max: 100 %   Max taken time: 22 0935    BP Readings from Last 3 Encounters:   22 (!) 156/56   22 (!) 142/70   22 130/78     BMI  There is no height or weight on file to calculate BMI. Estimated body mass index is 22.85 kg/m² as calculated from the following:    Height as of an earlier encounter on 22: 5' (1.524 m). Weight as of an earlier encounter on 22: 117 lb (53.1 kg).     CBC   Lab Results   Component Value Date    WBC 5.7 2022    RBC 4.64 2022 HGB 13.5 04/06/2022    HCT 40.2 04/06/2022    MCV 86.5 04/06/2022    RDW 14.9 04/06/2022     04/06/2022     CMP    Lab Results   Component Value Date     09/15/2021    K 5.5 09/15/2021    CL 97 09/15/2021    CO2 26 09/15/2021    BUN 21 09/15/2021    CREATININE 2.0 09/15/2021    GFRAA 28 09/15/2021    GFRAA >60 08/10/2011    AGRATIO 1.2 04/12/2017    LABGLOM 23 09/15/2021    LABGLOM 44 11/17/2016    LABGLOM 44 11/17/2016    GLUCOSE 104 09/15/2021    GLUCOSE 114 02/18/2020    PROT 6.7 04/12/2017    PROT 7.3 08/10/2011    CALCIUM 9.1 09/15/2021    BILITOT 0.3 04/12/2017    ALKPHOS 69 04/12/2017    AST 13 04/12/2017    ALT 12 04/12/2017     BMP    Lab Results   Component Value Date     09/15/2021    K 5.5 09/15/2021    CL 97 09/15/2021    CO2 26 09/15/2021    BUN 21 09/15/2021    CREATININE 2.0 09/15/2021    CALCIUM 9.1 09/15/2021    GFRAA 28 09/15/2021    GFRAA >60 08/10/2011    LABGLOM 23 09/15/2021    LABGLOM 44 11/17/2016    LABGLOM 44 11/17/2016    GLUCOSE 104 09/15/2021    GLUCOSE 114 02/18/2020     POCGlucose  No results for input(s): GLUCOSE in the last 72 hours.    Coags    Lab Results   Component Value Date    PROTIME 11.3 04/02/2018    INR 1.00 04/02/2018    APTT 33.0 78/16/1580     HCG (If Applicable) No results found for: PREGTESTUR, PREGSERUM, HCG, HCGQUANT   ABGs No results found for: PHART, PO2ART, RXK7EKB, VEJ7ZKK, BEART, L2SKIBSP   Type & Screen (If Applicable)  No results found for: LABABO, LABRH                         BMI: Wt Readings from Last 3 Encounters:       NPO Status:                          Anesthesia Evaluation  Patient summary reviewed no history of anesthetic complications:   Airway: Mallampati: II  TM distance: >3 FB   Neck ROM: full   Dental:    (+) partials      Pulmonary:normal exam    (+) COPD:                             Cardiovascular:  Exercise tolerance: poor (<4 METS),   (+) hypertension:,         Rhythm: irregular  Rate: normal           Beta Blocker:  Dose within 24 Hrs         Neuro/Psych:   (+) neuromuscular disease:, TIA (no residual sx per pt),             GI/Hepatic/Renal:   (+) GERD:, renal disease: CRI,           Endo/Other:    (+) DiabetesType II DM, , hypothyroidism, blood dyscrasia (plavix, eliquis last dose last Monday): anticoagulation therapy:., .                 Abdominal:             Vascular: negative vascular ROS. Other Findings:               Anesthesia Plan      general     ASA 3       Induction: intravenous. MIPS: Postoperative opioids intended and Prophylactic antiemetics administered. Anesthetic plan and risks discussed with patient and child/children. Plan discussed with CRNA. This pre-anesthesia assessment may be used as a history and physical.    DOS STAFF ADDENDUM:    Pt seen and examined, chart reviewed (including anesthesia, drug and allergy history). No interval changes to history and physical examination. Anesthetic plan, risks, benefits, alternatives, and personnel involved discussed with patient. Patient verbalized an understanding and agrees to proceed.       Reza Gordon MD  April 6, 2022  9:50 AM

## 2022-04-07 NOTE — OP NOTE
Patient Name: Radha Zuniga  YOB: 1933  Medical Record Number:  [id number]  Billing Number:  [id number]  Date of Procedure: 04/07/22   Time: 6529    Pre Operative Diagnoses:   1. Basal cell neoplasm, recurrent  2. Right buccal mass    Post Operative Diagnoses: Same as above    Procedure:  1. Complex excision of right buccal mass  2. Closure of right buccal mucosa, 3 cm    Surgeon: Dr. Leisa Villasenor,     OR Staff/ Assistant: Circulator: Vaibhav Carmona RN; Dimitrios Menjivar RN  Scrub Person First: Todd Thomas  Circulator Assist: Vaibhav Carmona RN    Anesthesia: General anesthesia. Findings: 1) Firm approximately 4 cm right buccal mucosal mass with dense scarring to buccinator muscle and right buccal mucosa. 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 She has had for surgical excision as of this mass in the past, with the first 1 being approximately 35 years ago. Over the last few months it has essentially tripled in size and causing her bothersome pain in her right cheek. Description of procedure:   After verification of informed consent in the preoperative area with the patient or the patient's guardian if necessary, the patient was brought to the operating room and placed in the supine position. General endotracheal anesthesia was induced. Timeout was performed and agreed upon by the entirety the operating staff identifying the patient's name, date of birth, procedure with laterality, allergies, beta-blocker status, and any pertinent surgical concerns. The patient was then prepped in the sterile and usual fashion. The Dorna Sidle was carefully placed for retraction and suspended from the [address] [place] [address] Road stand. After this the left buccal mass was palpated and noted to be fairly large and anterior to the Stensen's duct. There was scarring from prior surgery overlying the left buccal mucosa.   1% lidocaine with 1:100,000 epinephrine was submucosally injected over the intended site of incision. An approximately 3 cm fusiform incision overlying the firm mass was made with a 15 blade. After this circumferential dissection was taken around the right buccal mass. It should be noted that buccinator muscle fibers were cut through and sacrificed given the depth and scarring of the mass. The mass was noted to be densely scarred in to surrounding tissue and very close and nearly adherent to the right cheek skin. Once circumferential dissection around the firm mass lesion was performed it was fully excised and sent for permanent histopathological analysis. Hemostasis was performed with bipolar cauterization on 15 W to ensure hemostasis in the wound bed. The wound bed was then copiously irrigated with normal saline. After this the wound was then closed in a simple interrupted fashion with multiple 4-0 chromic sutures in a simple erupted fashion. This concluded our procedure and the patient was turned over to Anesthesiology for awakening and extubation    Specimens:   ID Type Source Tests Collected by Time Destination   A : A.  RIGHT BUCCAL SUBMUCOSAL MASS Tissue Tissue SURGICAL PATHOLOGY Rani Sauer DO 4/6/2022 1137         Drains: None    Estimated Blood Loss: 15 ml    Complications: None

## 2022-04-12 ENCOUNTER — OFFICE VISIT (OUTPATIENT)
Dept: ENT CLINIC | Age: 87
End: 2022-04-12

## 2022-04-12 VITALS — DIASTOLIC BLOOD PRESSURE: 70 MMHG | SYSTOLIC BLOOD PRESSURE: 127 MMHG | TEMPERATURE: 97.2 F | HEART RATE: 80 BPM

## 2022-04-12 DIAGNOSIS — C08.9: Primary | ICD-10-CM

## 2022-04-12 PROCEDURE — 99024 POSTOP FOLLOW-UP VISIT: CPT | Performed by: STUDENT IN AN ORGANIZED HEALTH CARE EDUCATION/TRAINING PROGRAM

## 2022-04-12 NOTE — PROGRESS NOTES
[address] VISIT      Patient Name: Pedro Jackson  Medical Record Number:  [id number]  Primary Care Physician:  Shantanu Love MD    ChiefComplaint     Chief Complaint   Patient presents with    Post-Op Check     no new concerns , everything is the same from last visit       History of Present Illness     Pedro Jackson is an 80 y.o. female previously seen for excision of right buccal mucosal mass on 4/6/2022. Interval History:   She had a lot of bruising along her right cheek immediately after surgery but this is improved. No significant pain. Past Medical History     Past Medical History:   Diagnosis Date    [facility]fib [facility])     [facility]     DDD (degenerative disc disease)     Diabetes mellitus ([facility]     Hypertension     Macular degeneration     bilateral    PVD (peripheral vascular disease) ([facility]     Reflux     Thyroid disease        Past Surgical History     Past Surgical History:   Procedure Laterality Date    ABDOMEN SURGERY      BIOPSY MOUTH LESION Right 6/17/2019    EXCISE LESION ON RIGHT SIDE CHEEK performed by Kevin Harada, MD at [address] [address] IMPLANT Right     CHOLECYSTECTOMY      COLON SURGERY      small bowel polyps    EYE SURGERY      for macular deg    HYSTERECTOMY      JOINT REPLACEMENT Right 03-    knee    KNEE ARTHROSCOPY  8-20-15    right medial menisectomy loose body.      KNEE ARTHROSCOPY Right 8/20/2015    RIGHT KNEE ARTHROSCOPIC PARTIAL MENISECTOMY AND REMOVAL OF LOOSE BODY    MOUTH SURGERY Right 4/6/2022    EXCISION RIGHT BUCCAL SUBMUCOSAL MASS (24156, [address]1457, [address]162) performed by Shelia Esteban DO at [facility] Right 10/31/2018    EXCISION MASS RIGHT BUCCAL MUCOSA performed by Kevin Harada, MD at [address] [place] [address] PRE-MALIGNANT / BENIGN SKIN LESION EXCISION  3/4/11    inside right cheek    SINUS SURGERY      TONSILLECTOMY         Family History     Family History   Problem Relation Age of Onset    Diabetes Mother     Heart Disease Father        Social History     Social History     Tobacco Use    Smoking status: Former Smoker    Smokeless tobacco: Never Used    Tobacco comment: quit [id number]   Vaping Use    Vaping Use: Never used   Substance Use Topics    Alcohol use: No    Drug use: No        Allergies     Allergies   Allergen Reactions    Cephalexin Hives    Morphine Other (See Comments)     Stops heart       Medications     Current Outpatient Medications   Medication Sig Dispense Refill    metoprolol (LOPRESSOR) 100 MG tablet Take 1 tablet by mouth 2 times daily 180 tablet 0    clopidogrel (PLAVIX) 75 MG tablet Take 1 tablet by mouth daily 90 tablet 0    apixaban (ELIQUIS) 2.5 MG TABS tablet Take 1 tablet by mouth 2 times daily 180 tablet 0    levothyroxine (SYNTHROID) 50 MCG tablet Take 1 tablet by mouth daily 90 tablet 0    pantoprazole (PROTONIX) 40 MG tablet Take 1 tablet by mouth daily 90 tablet 0    spironolactone (ALDACTONE) 25 MG tablet Take 1 tablet by mouth daily 90 tablet 0    gabapentin (NEURONTIN) 300 MG capsule Take 1 capsule by mouth 2 times daily for 90 days. Intended supply: 30 days 180 capsule 0    Calcium Polycarbophil (FIBER) 625 MG TABS Take 1 tablet by mouth 2 times daily 60 tablet 5    Calcium Carbonate-Vitamin D (CALTRATE 600+D PO) Take 1 tablet by mouth 2 times daily.  Multiple Vitamin (MULTIVITAMIN PO) Take  by mouth daily.  albuterol sulfate HFA (PROVENTIL HFA) 108 (90 Base) MCG/ACT inhaler Inhale 2 puffs into the lungs every 6 hours as needed for Wheezing 3 Inhaler 1     No current facility-administered medications for this visit. Review of Systems     REVIEW OF SYSTEM: See HPI above    PhysicalExam     There were no vitals filed for this visit. PHYSICAL EXAM  There were no vitals taken for this visit.     GENERAL: No acute distress, alert and oriented. EYES: EOMI, Anti-icteric. ORAL CAVITY: Right buccal region with some underlying firmness, possibly underlying hematoma formation. There is some ecchymosis along the right buccal skin and mandibular skin. Right buccal mucosal incision site intact. NECK: Normal range of motion, no thyromegaly, trachea is midline, no palpable lymphadenopathy or neck masses, no crepitus    I have performed a head and neck physical exam personally or was physically present during the key or critical portions of the service. [address] [address] [address] [place], [place]  [address]                                        Fax [phone]   [phone]   Department of Pathology   FINAL SURGICAL PATHOLOGY REPORT   Patient Name: Harris Martinez           Accession No:  [id number]    Age Sex:   1933    89 Y / F       [facility]   Account No:   [de-identified]                  Collected:     2022   Med Rec No:    [id number]                 Received:      2022   Attend Phys:   Jerri Da Silva DO          Completed:     2022   Perform Phys: Jerri Da Silva DO       FINAL DIAGNOSIS:     Right buccal submucosal mass, excision:      - Positive for carcinoma; favor basal cell adenocarcinoma- See        Comment/ Case Summary. COMMENT:     This specimen shows a basaloid neoplasm with infiltrate growth extending   to adipose tissue and muscular tissue. This infiltrative growth pattern   supports the diagnosis of carcinoma. CK7 stain shows diffuse reactivity. SMA and S100 stains show patchy weak staining. P63 and Calponin stains   are mostly negative. Together with patient's history of basal cell   adenoma/ basal cell neoplasm (incompletely excised), basal cell   adenocarcinoma is favored.      Two fragments are received measuring 1.2cm and 2.8cm in greatest dimension. Microscopically, both fragments show diffuse tumor   involvement. The accurate tumor size is difficult to determine due to   tissue fragmentation; the pT staging is at least pT2; clinical   correlation is necessary. The tumor extending to the specimen edges on both fragments (not   oriented); the resection margin is likely involved by carcinoma. Please   correlate with surgical procedure. Assessment and Plan     1. Basal cell adenocarcinoma of salivary gland ([address]  -Healing appropriately. With some firmness underlying incision site which is likely secondary to small hematoma that should resolve over the next few weeks. -Reviewed pathology results with patient. Noted to have basal cell adenocarcinoma with what appears to be positive margins. Past histology demonstrated basal cell neoplasm without definitive lymphovascular or perineural invasion. Discussed possible treatment options going further including observation versus radiation. She will follow back up in approximately 1 month to ensure that the wound healing appropriately with resolution of her underlying hematoma we can revisit possible radiation at that time. Follow-Up     Return in about 1 month (around 5/12/2022). Luci Cochran   Department of Otolaryngology/Head and Neck Surgery  4/12/22    Medical Decision Making: The following items were considered in medical decision making:  Independent review of images  Review / order clinical lab tests  Review / order radiology tests  Decision to obtain old records      This note was generated completely or in part utilizing Dragon dictation speech recognition software. Occasionally, words are mistranscribed and despite editing, the text may contain inaccuracies due to incorrect word recognition. If further clarification is needed please contact the office at [phone].

## 2022-04-28 PROBLEM — N18.30 CHRONIC RENAL DISEASE, STAGE III (HCC): Status: ACTIVE | Noted: 2022-04-28

## 2022-05-24 ENCOUNTER — OFFICE VISIT (OUTPATIENT)
Dept: ENT CLINIC | Age: 87
End: 2022-05-24

## 2022-05-24 VITALS
WEIGHT: 119 LBS | BODY MASS INDEX: 23.24 KG/M2 | DIASTOLIC BLOOD PRESSURE: 75 MMHG | SYSTOLIC BLOOD PRESSURE: 151 MMHG | TEMPERATURE: 93.9 F | HEART RATE: 54 BPM

## 2022-05-24 DIAGNOSIS — C08.9: Primary | ICD-10-CM

## 2022-05-24 PROCEDURE — 99024 POSTOP FOLLOW-UP VISIT: CPT | Performed by: STUDENT IN AN ORGANIZED HEALTH CARE EDUCATION/TRAINING PROGRAM

## 2022-05-24 NOTE — PROGRESS NOTES
[address] VISIT      Patient Name: Noemi Nuno  Medical Record Number:  [id number]  Primary Care Physician:  Bela Gerber MD    ChiefComplaint     Chief Complaint   Patient presents with    Follow-up     jaw issues , no new concerns       History of Present Illness     Noemi Nuno is an 80 y.o. 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 Interval History:   Doing well after surgery. No right cheek pain. No bleeding. She does have a little dimple in her right cheek after the surgery that has been persistent but is not bothersome to her. Past Medical History     Past Medical History:   Diagnosis Date    A-fib [facility])     [facility]     DDD (degenerative disc disease)     Diabetes mellitus [facility]     Hypertension     Macular degeneration     bilateral    PVD [facility]peripheral vascular disease) [facility]     Reflux     Thyroid disease        Past Surgical History     Past Surgical History:   Procedure Laterality Date    ABDOMEN SURGERY      BIOPSY MOUTH LESION Right 6/17/2019    EXCISE LESION ON RIGHT SIDE CHEEK performed by Syd Johns MD at [address] [place] [address] [place] [employer] Right     CHOLECYSTECTOMY      COLON SURGERY      small bowel polyps    EYE SURGERY      for macular deg    HYSTERECTOMY      JOINT REPLACEMENT Right 03-    knee    KNEE ARTHROSCOPY  8-20-15    right medial menisectomy loose body.      KNEE ARTHROSCOPY Right 8/20/2015    RIGHT KNEE ARTHROSCOPIC PARTIAL MENISECTOMY AND REMOVAL OF LOOSE BODY    MOUTH SURGERY Right 4/6/2022    EXCISION RIGHT BUCCAL SUBMUCOSAL MASS (90095, [id number], [id number]) performed by Abdiaziz Ortiz DO at [place] [id number] Right 10/31/2018    EXCISION MASS RIGHT BUCCAL MUCOSA performed [facility] at [facility]  PRE-MALIGNANT / BENIGN SKIN LESION EXCISION  3/4/11    inside right cheek    SINUS SURGERY      TONSILLECTOMY         Family History     Family History   Problem Relation Age of Onset    Diabetes Mother     Heart Disease Father        Social History     Social History     Tobacco Use    Smoking status: Former Smoker    Smokeless tobacco: Never Used    Tobacco comment: quit [id number]   Vaping Use    Vaping Use: Never used   Substance Use Topics    Alcohol use: No    Drug use: No        Allergies     Allergies   Allergen Reactions    Cephalexin Hives    Morphine Other (See Comments)     Stops heart       Medications     Current Outpatient Medications   Medication Sig Dispense Refill    metoprolol (LOPRESSOR) 100 MG tablet Take 1 tablet by mouth 2 times daily 180 tablet 0    clopidogrel (PLAVIX) 75 MG tablet Take 1 tablet by mouth daily 90 tablet 0    apixaban (ELIQUIS) 2.5 MG TABS tablet Take 1 tablet by mouth 2 times daily 180 tablet 0    levothyroxine (SYNTHROID) 50 MCG tablet Take 1 tablet by mouth daily 90 tablet 0    pantoprazole (PROTONIX) 40 MG tablet Take 1 tablet by mouth daily 90 tablet 0    spironolactone (ALDACTONE) 25 MG tablet Take 1 tablet by mouth daily 90 tablet 0    gabapentin (NEURONTIN) 300 MG capsule Take 1 capsule by mouth 2 times daily for 90 days. Intended supply: 30 days 180 capsule 0    Calcium Polycarbophil (FIBER) 625 MG TABS Take 1 tablet by mouth 2 times daily 60 tablet 5    Calcium Carbonate-Vitamin D (CALTRATE 600+D PO) Take 1 tablet by mouth 2 times daily.  Multiple Vitamin (MULTIVITAMIN PO) Take  by mouth daily.  albuterol sulfate HFA (PROVENTIL HFA) 108 (90 Base) MCG/ACT inhaler Inhale 2 puffs into the lungs every 6 hours as needed for Wheezing 3 Inhaler 1     No current facility-administered medications for this visit.        Review of Systems     REVIEW OF SYSTEM: See HPI above    PhysicalExam     Vitals:    05/24/22 1301   BP: (!) 151/75   Site: Right Upper Arm   Position: Sitting   Cuff Size: Medium Adult   Pulse: 54   Temp: 93.9 °F (34.4 °C)   TempSrc: Temporal   Weight: 119 lb (54 kg)       PHYSICAL EXAM  BP (!) 151/75 (Site: Right Upper Arm, Position: Sitting, Cuff Size: Medium Adult)   Pulse 54   Temp 93.9 °F (34.4 °C) (Temporal)   Wt 119 lb (54 kg)   BMI 23.24 kg/m²       GENERAL: No acute distress, alert and oriented. EYES: EOMI, Anti-icteric. ORAL CAVITY: Right buccal region with some underlying firmness, buccal incision site well-healed. No drainage from the right buccal mucosa. NECK: Normal range of motion, no thyromegaly, trachea is midline, no palpable lymphadenopathy or neck masses, no crepitus    I have performed a head and neck physical exam personally or was physically present during the key or critical portions of the service. Data/Imaging Review      [address] [place], [place]  [address]                                        Fax [phone]   [phone]   Department of Pathology   FINAL SURGICAL PATHOLOGY REPORT   Patient Name: Tiffani Villalta           Accession No:  [id number]    Age Sex:   1933    89 Y / F       [facility]   Account No:   [de-identified]                  Collected:     2022   Med Rec No:    [id number]                 Received:      2022   Attend Phys:   Rani Chairez DO          Completed:     2022   Perform Phys: Rani Chairez DO       FINAL DIAGNOSIS:     Right buccal submucosal mass, excision:      - Positive for carcinoma; favor basal cell adenocarcinoma- See        Comment/ Case Summary. COMMENT:     This specimen shows a basaloid neoplasm with infiltrate growth extending   to adipose tissue and muscular tissue. This infiltrative growth pattern   supports the diagnosis of carcinoma. CK7 stain shows diffuse reactivity.    SMA and S100 stains show recognition. If further clarification is needed please contact the office at [phone].

## 2022-08-16 ENCOUNTER — HOSPITAL ENCOUNTER (OUTPATIENT)
Dept: CT IMAGING | Age: 87
Discharge: HOME | End: 2022-08-16
Payer: MEDICARE

## 2022-08-16 ENCOUNTER — OFFICE VISIT (OUTPATIENT)
Dept: SURGERY | Age: 87
End: 2022-08-16
Payer: MEDICARE

## 2022-08-16 VITALS — WEIGHT: 122 LBS | SYSTOLIC BLOOD PRESSURE: 142 MMHG | DIASTOLIC BLOOD PRESSURE: 65 MMHG | BODY MASS INDEX: 23.83 KG/M2

## 2022-08-16 DIAGNOSIS — R59.0 AXILLARY LYMPHADENOPATHY: ICD-10-CM

## 2022-08-16 DIAGNOSIS — R22.1 NECK MASS: ICD-10-CM

## 2022-08-16 DIAGNOSIS — R22.1 NECK MASS: Primary | ICD-10-CM

## 2022-08-16 PROCEDURE — 1090F PRES/ABSN URINE INCON ASSESS: CPT | Performed by: SURGERY

## 2022-08-16 PROCEDURE — G8427 DOCREV CUR MEDS BY ELIG CLIN: HCPCS | Performed by: SURGERY

## 2022-08-16 PROCEDURE — 99203 OFFICE O/P NEW LOW 30 MIN: CPT | Performed by: SURGERY

## 2022-08-16 PROCEDURE — 71250 CT THORAX DX C-: CPT

## 2022-08-16 PROCEDURE — 70490 CT SOFT TISSUE NECK W/O DYE: CPT

## 2022-08-16 PROCEDURE — G8420 CALC BMI NORM PARAMETERS: HCPCS | Performed by: SURGERY

## 2022-08-16 NOTE — PROGRESS NOTES
[facility] GENERAL AND LAPAROSCOPIC SURGERY                       PATIENT NAME: Yasmeen Jackson        TODAY'S DATE: 8/16/2022    Reason for Consult:  MAss    Requesting Physician:  Dr. Chris Blas:              The patient is a 80 y.o. female who presents with a mass of the right axilla. Pt has noted present for weeks to mos. Went to OSH ER and noted arm swelling. Right arm feels heavy and enlarged. DVT scan done was negative. Pt has ability to use arm, normal . Pt has had no prior arm, axilla , chest surgery. Has had Basal Cell Ca removed from Buccal mucosa. No other know cancer history. Currently following with ENT. No F/C. No night sweats. Takes ELiquis. Past Medical History:        Diagnosis Date    A-fib [facility])     COPD (chronic obstructive pulmonary disease) (HCC)     DDD (degenerative disc disease)     Diabetes mellitus ([facility]     Hypertension     Macular degeneration     bilateral    PVD (peripheral vascular disease) ([facility]     Reflux     Thyroid disease        Past Surgical History:        Procedure Laterality Date    ABDOMEN SURGERY      BIOPSY MOUTH LESION Right 6/17/2019    EXCISE LESION ON RIGHT SIDE CHEEK performed by Suresh Kim MD at [address] Right     CHOLECYSTECTOMY      COLON SURGERY      small bowel polyps    EYE SURGERY      for macular deg    HYSTERECTOMY (CERVIX STATUS UNKNOWN)      JOINT REPLACEMENT Right 03-    knee    KNEE ARTHROSCOPY  8-20-15    right medial menisectomy loose body.      KNEE ARTHROSCOPY Right 8/20/2015    RIGHT KNEE ARTHROSCOPIC PARTIAL MENISECTOMY AND REMOVAL OF LOOSE BODY    MOUTH SURGERY Right 4/6/2022    EXCISION RIGHT BUCCAL SUBMUCOSAL MASS (85308, [id number], [id number]) performed by Radha Dominguez DO at [address] Right 10/31/2018    EXCISION MASS RIGHT BUCCAL MUCOSA performed by Suresh Kim MD at [facility]    PRE-MALIGNANT / BENIGN SKIN LESION EXCISION  3/4/11    inside right cheek    SINUS SURGERY      TONSILLECTOMY         Current Medications:   No current facility-administered medications for this visit. Prior to Admission medications    Medication Sig Start Date End Date Taking? Authorizing Provider   tiZANidine (ZANAFLEX) 4 MG tablet Take 1 tablet by mouth nightly 8/12/22 11/10/22 Yes Binu Tamez MD   metoprolol (LOPRESSOR) 100 MG tablet Take 1 tablet by mouth 2 times daily 7/11/22 10/9/22 Yes Binu Tamez MD   clopidogrel (PLAVIX) 75 MG tablet Take 1 tablet by mouth daily 7/11/22 10/9/22 Yes Binu Tamez MD   apixaban (ELIQUIS) 2.5 MG TABS tablet Take 1 tablet by mouth 2 times daily 7/11/22 10/9/22 Yes Binu Tamez MD   levothyroxine (SYNTHROID) 50 MCG tablet Take 1 tablet by mouth daily 7/11/22 10/9/22 Yes Binu Tamez MD   pantoprazole (PROTONIX) 40 MG tablet Take 1 tablet by mouth daily 7/11/22 10/9/22 Yes Binu Tamez MD   spironolactone (ALDACTONE) 25 MG tablet Take 1 tablet by mouth daily 7/11/22 10/9/22 Yes Binu Tamez MD   gabapentin (NEURONTIN) 300 MG capsule Take 1 capsule by mouth 2 times daily for 90 days. Intended supply: 30 days 7/11/22 10/9/22 Yes Binu Tamez MD   Calcium Polycarbophil (FIBER) 625 MG TABS Take 1 tablet by mouth 2 times daily 8/31/17  Yes Binu Tamez MD   Calcium Carbonate-Vitamin D (CALTRATE 600+D PO) Take 1 tablet by mouth 2 times daily. 2/28/11  Yes Historical Provider, MD   Multiple Vitamin (MULTIVITAMIN PO) Take  by mouth daily. 2/28/11  Yes Historical Provider, MD   albuterol sulfate HFA (PROVENTIL HFA) 108 (90 Base) MCG/ACT inhaler Inhale 2 puffs into the lungs every 6 hours as needed for Wheezing 3/23/21 6/21/21  Binu Tamez MD        Allergies:  Cephalexin and Morphine    Social History:    reports that she has quit smoking. She has never used smokeless tobacco. She reports that she does not drink alcohol and does not use drugs.     Family History: Problem Relation Age of Onset    Diabetes Mother     Heart Disease Father        REVIEW OF SYSTEMS:  Review of Systems   Constitutional: Negative. HENT: Negative. Eyes: Negative. Respiratory: Negative. Cardiovascular: Negative. Gastrointestinal: Negative. Endocrine: Negative. Genitourinary: Negative. Musculoskeletal:  Positive for back pain, joint swelling and neck pain. Skin:  Positive for color change. Allergic/Immunologic: Negative. Neurological:  Positive for headaches. Hematological:  Bruises/bleeds easily. Psychiatric/Behavioral: Negative. PHYSICAL EXAM:  VITALS:  BP (!) 142/65   Wt 122 lb (55.3 kg)   BMI 23.83 kg/m²   CONSTITUTIONAL:  alert, no apparent distress and thin  EYES:  sclera clear  ENT:  normocepalic, without obvious abnormality  NECK:  supple, symmetrical, trachea midline and no carotid bruits, small nodule palpable on right, above clavicle area. AXILLA: right large mass present, firm, left, normal  LUNGS:  clear to auscultation  CARDIOVASCULAR:  regular rate and rhythm  ABDOMEN:  thin, normal bowel sounds, soft, non-distended, non-tender, voluntary guarding absent, no masses palpated, no hepatosplenomegally and hernia absent  MUSCULOSKELETAL:  3+ pitting edema right upper extremity  NEUROLOGIC:  Mental Status Exam:  Level of Alertness:   awake  Orientation:   person, place, time  SKIN:  no bruising or bleeding, normal skin color, texture, turgor, and no redness, warmth, or swelling        Radiology Review:   None    IMPRESSION/RECOMMENDATIONS:    Right axilla mass  Right neck node as well    Uncertain etiology - appears may be metastatic cancer  Will check CT scan, chest and neck    Neck or axilla node bx - schedule  Likely oncology referral thereafter    The problem and plan were discussed in detail with the patient today. All questions have been answered, and they agree to proceed.     Thank you,    Jason Alberts MD

## 2022-08-22 ASSESSMENT — ENCOUNTER SYMPTOMS
COLOR CHANGE: 1
RESPIRATORY NEGATIVE: 1
EYES NEGATIVE: 1
ALLERGIC/IMMUNOLOGIC NEGATIVE: 1
GASTROINTESTINAL NEGATIVE: 1
BACK PAIN: 1

## 2022-08-22 NOTE — PROGRESS NOTES
Name_______________________________________Printed:____________________  Date and time of surgery__8/26/2022____1200__________________Arrival Time:__1030  main______________   1. The instructions given regarding when and if a patient needs to stop oral intake prior to surgery varies. Follow the specific instructions you were given                  _x__Nothing to eat or to drink after Midnight the night before.                   ____Carbo loading or ERAS instructions will be given to select patients-if you have been given those instructions -please do the following                           The evening before your surgery after dinner before midnight drink 40 ounces of gatorade. If you are diabetic use sugar free. The morning of surgery drink 40 ounces of water. This needs to be finished 3 hours prior to your surgery start time. 2. Take the following pills with a small sip of water on the morning of surgery__metoprolol, levothroxine, patoprazole_________________________________________________                  Do not take blood pressure medications ending in pril or sartan the silva prior to surgery or the morning of surgery_   3. Aspirin, Ibuprofen, Advil, Naproxen, Vitamin E and other Anti-inflammatory products and supplements should be stopped for 5 -7days before surgery or as directed by your physician. 4. Check with your Doctor regarding stopping Plavix, Coumadin,Eliquis, Lovenox,Effient,Pradaxa,Xarelto, Fragmin or other blood thinners and follow their instructions. 5. Do not smoke, and do not drink any alcoholic beverages 24 hours prior to surgery. This includes NA Beer. Refrain from the usage of any recreational drugs. 6. You may brush your teeth and gargle the morning of surgery. DO NOT SWALLOW WATER   7. You MUST make arrangements for a responsible adult to stay on site while you are here and take you home after your surgery. You will not be allowed to leave alone or drive yourself home.   It is strongly suggested someone stay with you the first 24 hrs. Your surgery will be cancelled if you do not have a ride home. 8. A parent/legal guardian must accompany a child scheduled for surgery and plan to stay at the hospital until the child is discharged. Please do not bring other children with you. 9. Please wear simple, loose fitting clothing to the hospital.  Greg Howard not bring valuables (money, credit cards, checkbooks, etc.) Do not wear any makeup (including no eye makeup) or nail polish on your fingers or toes. 10. DO NOT wear any jewelry or piercings on day of surgery. All body piercing jewelry must be removed. 11. If you have _x__dentures, they will be removed before going to the OR; we will provide you a container. If you wear ___contact lenses or _x__glasses, they will be removed; please bring a case for them. 12. Please see your family doctor/pediatrician for a history & physical and/or concerning medications. Bring any test results/reports from your physician's office. PCP__________________Phone___________H&P Appt. Date________             13 If you  have a Living Will and Durable Power of  for Healthcare, please bring in a copy. 15. Notify your Surgeon if you develop any illness between now and surgery  time, cough, cold, fever, sore throat, nausea, vomiting, etc.  Please notify your surgeon if you experience dizziness, shortness of breath or blurred vision between now & the time of your surgery             15. DO NOT shave your operative site 96 hours prior to surgery. For face & neck surgery, men may use an electric razor 48 hours prior to surgery. 16. Shower the night before or morning of surgery using an antibacterial soap or as you have been instructed. 17. To provide excellent care visitors will be limited to one in the room at any given time. 18.  Please bring picture ID and insurance card. 19.  Visit our web site for additional information:  [email or web address]              20.During flu season no children under the age of 15 are permitted in the hospital for the safety of all patients. 21. If you take a long acting insulin in the evening only  take half of your usual  dose the night  before your procedure              22. If you use a c-pap please bring DOS if staying overnight,             23.For your convenience [facility] has a pharmacy on site to fill your prescriptions. 24. If you use oxygen and have a portable tank please bring it  with you the DOS             25. Bring a complete list of all your medications with name and dose include any supplements. 26. Other__________________________________________   *Please call pre admission testing if you any further questions   [place] [address]. Air  [phone]   [address] [place] [address]       VISITOR POLICY(subject to change)    Current policy is 2 visitors per patient. No children. A mask is required. Visiting hours are 8a-8p. Overnight visitors will be at the discretion of the nurse. All above information reviewed with patient in person or by phone. Patient verbalizes understanding. All questions and concerns addressed.                                                                                                  Patient/Rep_patient___________________                                                                                                                                    PRE OP INSTRUCTIONS

## 2022-08-26 ENCOUNTER — ANESTHESIA EVENT (OUTPATIENT)
Dept: OPERATING ROOM | Age: 87
End: 2022-08-26
Payer: MEDICARE

## 2022-08-26 ENCOUNTER — ANESTHESIA (OUTPATIENT)
Dept: OPERATING ROOM | Age: 87
End: 2022-08-26
Payer: MEDICARE

## 2022-08-26 ENCOUNTER — HOSPITAL ENCOUNTER (OUTPATIENT)
Age: 87
Setting detail: OUTPATIENT SURGERY
Discharge: HOME | End: 2022-08-26
Attending: SURGERY | Admitting: SURGERY
Payer: MEDICARE

## 2022-08-26 VITALS
TEMPERATURE: 97.2 F | SYSTOLIC BLOOD PRESSURE: 128 MMHG | HEIGHT: 60 IN | RESPIRATION RATE: 13 BRPM | BODY MASS INDEX: 23.7 KG/M2 | WEIGHT: 120.7 LBS | DIASTOLIC BLOOD PRESSURE: 56 MMHG | HEART RATE: 68 BPM | OXYGEN SATURATION: 96 %

## 2022-08-26 DIAGNOSIS — R59.0 CERVICAL LYMPHADENOPATHY: Primary | ICD-10-CM

## 2022-08-26 DIAGNOSIS — C79.9 METASTATIC CARCINOMA (HCC): Primary | ICD-10-CM

## 2022-08-26 LAB
GLUCOSE BLD-MCNC: 118 MG/DL (ref 70–99)
HCT VFR BLD CALC: 39.5 % (ref 36–48)
HEMOGLOBIN: 13.2 G/DL (ref 12–16)
MCH RBC QN AUTO: 29.8 PG (ref 26–34)
MCHC RBC AUTO-ENTMCNC: 33.5 G/DL (ref 31–36)
MCV RBC AUTO: 89 FL (ref 80–100)
PDW BLD-RTO: 14.7 % (ref 12.4–15.4)
PERFORMED ON: ABNORMAL
PLATELET # BLD: 255 K/UL (ref 135–450)
PMV BLD AUTO: 9.2 FL (ref 5–10.5)
RBC # BLD: 4.44 M/UL (ref 4–5.2)
REASON FOR REJECTION: NORMAL
REJECTED TEST: NORMAL
WBC # BLD: 6.7 K/UL (ref 4–11)

## 2022-08-26 PROCEDURE — 88360 TUMOR IMMUNOHISTOCHEM/MANUAL: CPT

## 2022-08-26 PROCEDURE — 36415 COLL VENOUS BLD VENIPUNCTURE: CPT

## 2022-08-26 PROCEDURE — 85027 COMPLETE CBC AUTOMATED: CPT

## 2022-08-26 PROCEDURE — 7100000000 HC PACU RECOVERY - FIRST 15 MIN: Performed by: SURGERY

## 2022-08-26 PROCEDURE — 88331 PATH CONSLTJ SURG 1 BLK 1SPC: CPT

## 2022-08-26 PROCEDURE — 38510 BIOPSY/REMOVAL LYMPH NODES: CPT | Performed by: SURGERY

## 2022-08-26 PROCEDURE — 88305 TISSUE EXAM BY PATHOLOGIST: CPT

## 2022-08-26 PROCEDURE — 88342 IMHCHEM/IMCYTCHM 1ST ANTB: CPT

## 2022-08-26 PROCEDURE — 2709999900 HC NON-CHARGEABLE SUPPLY: Performed by: SURGERY

## 2022-08-26 PROCEDURE — A4217 STERILE WATER/SALINE, 500 ML: HCPCS | Performed by: SURGERY

## 2022-08-26 PROCEDURE — 2500000003 HC RX 250 WO HCPCS: Performed by: NURSE ANESTHETIST, CERTIFIED REGISTERED

## 2022-08-26 PROCEDURE — 2500000003 HC RX 250 WO HCPCS

## 2022-08-26 PROCEDURE — 7100000011 HC PHASE II RECOVERY - ADDTL 15 MIN: Performed by: SURGERY

## 2022-08-26 PROCEDURE — 6360000002 HC RX W HCPCS: Performed by: NURSE ANESTHETIST, CERTIFIED REGISTERED

## 2022-08-26 PROCEDURE — 7100000001 HC PACU RECOVERY - ADDTL 15 MIN: Performed by: SURGERY

## 2022-08-26 PROCEDURE — 3700000001 HC ADD 15 MINUTES (ANESTHESIA): Performed by: SURGERY

## 2022-08-26 PROCEDURE — 3600000002 HC SURGERY LEVEL 2 BASE: Performed by: SURGERY

## 2022-08-26 PROCEDURE — 2500000003 HC RX 250 WO HCPCS: Performed by: SURGERY

## 2022-08-26 PROCEDURE — 2720000010 HC SURG SUPPLY STERILE: Performed by: SURGERY

## 2022-08-26 PROCEDURE — 2580000003 HC RX 258: Performed by: SURGERY

## 2022-08-26 PROCEDURE — 2580000003 HC RX 258: Performed by: NURSE ANESTHETIST, CERTIFIED REGISTERED

## 2022-08-26 PROCEDURE — 7100000010 HC PHASE II RECOVERY - FIRST 15 MIN: Performed by: SURGERY

## 2022-08-26 PROCEDURE — 88341 IMHCHEM/IMCYTCHM EA ADD ANTB: CPT

## 2022-08-26 PROCEDURE — 3700000000 HC ANESTHESIA ATTENDED CARE: Performed by: SURGERY

## 2022-08-26 PROCEDURE — 3600000012 HC SURGERY LEVEL 2 ADDTL 15MIN: Performed by: SURGERY

## 2022-08-26 RX ORDER — SODIUM CHLORIDE 0.9 % (FLUSH) 0.9 %
5-40 SYRINGE (ML) INJECTION PRN
Status: DISCONTINUED | OUTPATIENT
Start: 2022-08-26 | End: 2022-08-26 | Stop reason: HOSPADM

## 2022-08-26 RX ORDER — CLINDAMYCIN PHOSPHATE 900 MG/50ML
INJECTION INTRAVENOUS
Status: COMPLETED
Start: 2022-08-26 | End: 2022-08-26

## 2022-08-26 RX ORDER — PROPOFOL 10 MG/ML
INJECTION, EMULSION INTRAVENOUS PRN
Status: DISCONTINUED | OUTPATIENT
Start: 2022-08-26 | End: 2022-08-26 | Stop reason: SDUPTHER

## 2022-08-26 RX ORDER — BUPIVACAINE HYDROCHLORIDE AND EPINEPHRINE 5; 5 MG/ML; UG/ML
INJECTION, SOLUTION EPIDURAL; INTRACAUDAL; PERINEURAL
Status: COMPLETED | OUTPATIENT
Start: 2022-08-26 | End: 2022-08-26

## 2022-08-26 RX ORDER — HYDROCODONE BITARTRATE AND ACETAMINOPHEN 5; 325 MG/1; MG/1
1 TABLET ORAL EVERY 6 HOURS PRN
Qty: 15 TABLET | Refills: 0 | Status: SHIPPED | OUTPATIENT
Start: 2022-08-26 | End: 2022-09-02

## 2022-08-26 RX ORDER — LIDOCAINE HYDROCHLORIDE 20 MG/ML
INJECTION, SOLUTION EPIDURAL; INFILTRATION; INTRACAUDAL; PERINEURAL PRN
Status: DISCONTINUED | OUTPATIENT
Start: 2022-08-26 | End: 2022-08-26 | Stop reason: SDUPTHER

## 2022-08-26 RX ORDER — SODIUM CHLORIDE 9 MG/ML
INJECTION, SOLUTION INTRAVENOUS CONTINUOUS PRN
Status: DISCONTINUED | OUTPATIENT
Start: 2022-08-26 | End: 2022-08-26 | Stop reason: SDUPTHER

## 2022-08-26 RX ORDER — MEPERIDINE HYDROCHLORIDE 25 MG/ML
12.5 INJECTION INTRAMUSCULAR; INTRAVENOUS; SUBCUTANEOUS EVERY 5 MIN PRN
Status: DISCONTINUED | OUTPATIENT
Start: 2022-08-26 | End: 2022-08-26 | Stop reason: HOSPADM

## 2022-08-26 RX ORDER — HYDROMORPHONE HCL 110MG/55ML
0.25 PATIENT CONTROLLED ANALGESIA SYRINGE INTRAVENOUS EVERY 5 MIN PRN
Status: DISCONTINUED | OUTPATIENT
Start: 2022-08-26 | End: 2022-08-26 | Stop reason: HOSPADM

## 2022-08-26 RX ORDER — MAGNESIUM HYDROXIDE 1200 MG/15ML
LIQUID ORAL CONTINUOUS PRN
Status: COMPLETED | OUTPATIENT
Start: 2022-08-26 | End: 2022-08-26

## 2022-08-26 RX ORDER — HYDROMORPHONE HCL 110MG/55ML
0.5 PATIENT CONTROLLED ANALGESIA SYRINGE INTRAVENOUS EVERY 5 MIN PRN
Status: DISCONTINUED | OUTPATIENT
Start: 2022-08-26 | End: 2022-08-26 | Stop reason: HOSPADM

## 2022-08-26 RX ORDER — ONDANSETRON 2 MG/ML
INJECTION INTRAMUSCULAR; INTRAVENOUS PRN
Status: DISCONTINUED | OUTPATIENT
Start: 2022-08-26 | End: 2022-08-26 | Stop reason: SDUPTHER

## 2022-08-26 RX ORDER — FENTANYL CITRATE 50 UG/ML
INJECTION, SOLUTION INTRAMUSCULAR; INTRAVENOUS PRN
Status: DISCONTINUED | OUTPATIENT
Start: 2022-08-26 | End: 2022-08-26 | Stop reason: SDUPTHER

## 2022-08-26 RX ORDER — CLINDAMYCIN PHOSPHATE 900 MG/50ML
900 INJECTION INTRAVENOUS ONCE
Status: COMPLETED | OUTPATIENT
Start: 2022-08-26 | End: 2022-08-26

## 2022-08-26 RX ORDER — DEXAMETHASONE SODIUM PHOSPHATE 4 MG/ML
INJECTION, SOLUTION INTRA-ARTICULAR; INTRALESIONAL; INTRAMUSCULAR; INTRAVENOUS; SOFT TISSUE PRN
Status: DISCONTINUED | OUTPATIENT
Start: 2022-08-26 | End: 2022-08-26 | Stop reason: SDUPTHER

## 2022-08-26 RX ORDER — ONDANSETRON 2 MG/ML
4 INJECTION INTRAMUSCULAR; INTRAVENOUS
Status: DISCONTINUED | OUTPATIENT
Start: 2022-08-26 | End: 2022-08-26 | Stop reason: HOSPADM

## 2022-08-26 RX ORDER — DIPHENHYDRAMINE HYDROCHLORIDE 50 MG/ML
12.5 INJECTION INTRAMUSCULAR; INTRAVENOUS
Status: DISCONTINUED | OUTPATIENT
Start: 2022-08-26 | End: 2022-08-26 | Stop reason: HOSPADM

## 2022-08-26 RX ORDER — SODIUM CHLORIDE 9 MG/ML
INJECTION, SOLUTION INTRAVENOUS PRN
Status: DISCONTINUED | OUTPATIENT
Start: 2022-08-26 | End: 2022-08-26 | Stop reason: HOSPADM

## 2022-08-26 RX ORDER — SODIUM CHLORIDE 0.9 % (FLUSH) 0.9 %
5-40 SYRINGE (ML) INJECTION EVERY 12 HOURS SCHEDULED
Status: DISCONTINUED | OUTPATIENT
Start: 2022-08-26 | End: 2022-08-26 | Stop reason: HOSPADM

## 2022-08-26 RX ADMIN — SODIUM CHLORIDE: 9 INJECTION, SOLUTION INTRAVENOUS at 12:11

## 2022-08-26 RX ADMIN — DEXAMETHASONE SODIUM PHOSPHATE 10 MG: 4 INJECTION, SOLUTION INTRAMUSCULAR; INTRAVENOUS at 12:28

## 2022-08-26 RX ADMIN — PHENYLEPHRINE HYDROCHLORIDE 100 MCG: 10 INJECTION INTRAVENOUS at 12:41

## 2022-08-26 RX ADMIN — ONDANSETRON 4 MG: 2 INJECTION INTRAMUSCULAR; INTRAVENOUS at 12:28

## 2022-08-26 RX ADMIN — FENTANYL CITRATE 50 MCG: 50 INJECTION, SOLUTION INTRAMUSCULAR; INTRAVENOUS at 12:18

## 2022-08-26 RX ADMIN — FENTANYL CITRATE 50 MCG: 50 INJECTION, SOLUTION INTRAMUSCULAR; INTRAVENOUS at 12:30

## 2022-08-26 RX ADMIN — LIDOCAINE HYDROCHLORIDE 60 MG: 20 INJECTION, SOLUTION EPIDURAL; INFILTRATION; INTRACAUDAL; PERINEURAL at 12:19

## 2022-08-26 RX ADMIN — PHENYLEPHRINE HYDROCHLORIDE 100 MCG: 10 INJECTION INTRAVENOUS at 12:47

## 2022-08-26 RX ADMIN — CLINDAMYCIN PHOSPHATE 900 MG: 900 INJECTION INTRAVENOUS at 12:06

## 2022-08-26 RX ADMIN — PHENYLEPHRINE HYDROCHLORIDE 100 MCG: 10 INJECTION INTRAVENOUS at 12:31

## 2022-08-26 RX ADMIN — PHENYLEPHRINE HYDROCHLORIDE 100 MCG: 10 INJECTION INTRAVENOUS at 12:55

## 2022-08-26 RX ADMIN — PHENYLEPHRINE HYDROCHLORIDE 200 MCG: 10 INJECTION INTRAVENOUS at 12:25

## 2022-08-26 RX ADMIN — PROPOFOL 120 MG: 10 INJECTION, EMULSION INTRAVENOUS at 12:19

## 2022-08-26 RX ADMIN — CLINDAMYCIN PHOSPHATE 900 MG: 900 INJECTION, SOLUTION INTRAVENOUS at 12:06

## 2022-08-26 ASSESSMENT — PAIN - FUNCTIONAL ASSESSMENT: PAIN_FUNCTIONAL_ASSESSMENT: 0-10

## 2022-08-26 NOTE — BRIEF OP NOTE
Brief Postoperative Note      Patient: Autumn Obando  YOB: 1933  MRN: [id number]    Date of Procedure: 8/26/2022    Pre-Op Diagnosis: R59.0  RIGHT CERVICAL ADENOPATHY    Post-Op Diagnosis: Same       Procedure(s):  RIGHT CERVICAL LYMPH NODE BIOPSY    Surgeon(s):  Reilly Isbell MD    Assistant:  Surgical Assistant: Andrei Barr    Anesthesia: General    Estimated Blood Loss (mL): Minimal    Complications: None    Specimens:   ID Type Source Tests Collected by Time Destination   A : A) RIGHT CERVICAL LYMPH NODE FROZEN Tissue Lymph Node SURGICAL PATHOLOGY Reilly Isbell MD 8/26/2022 1239    B : B) RIGHT CERVICAL LYMPH NODE FRESH Tissue Lymph Node SURGICAL PATHOLOGY Reilly Isbell MD 8/26/2022 1242        Implants:  * No implants in log *      Drains: * No LDAs found *    Findings: 2 cm cervical node sent to path, frozen positive for carcinoma, final path pending    Electronically signed by Reilly Isbell MD on 8/26/2022 at 1:03 PM

## 2022-08-26 NOTE — ANESTHESIA PRE PROCEDURE
tablet 0    clopidogrel (PLAVIX) 75 MG tablet Take 1 tablet by mouth daily 90 tablet 0    apixaban (ELIQUIS) 2.5 MG TABS tablet Take 1 tablet by mouth 2 times daily 180 tablet 0    levothyroxine (SYNTHROID) 50 MCG tablet Take 1 tablet by mouth daily 90 tablet 0    pantoprazole (PROTONIX) 40 MG tablet Take 1 tablet by mouth daily 90 tablet 0    spironolactone (ALDACTONE) 25 MG tablet Take 1 tablet by mouth daily 90 tablet 0    gabapentin (NEURONTIN) 300 MG capsule Take 1 capsule by mouth 2 times daily for 90 days. Intended supply: 30 days 180 capsule 0    albuterol sulfate HFA (PROVENTIL HFA) 108 (90 Base) MCG/ACT inhaler Inhale 2 puffs into the lungs every 6 hours as needed for Wheezing (Patient not taking: Reported on 8/22/2022) 3 Inhaler 1    Calcium Polycarbophil (FIBER) 625 MG TABS Take 1 tablet by mouth 2 times daily 60 tablet 5    Calcium Carbonate-Vitamin D (CALTRATE 600+D PO) Take 1 tablet by mouth 2 times daily.  Multiple Vitamin (MULTIVITAMIN PO) Take  by mouth daily. No current facility-administered medications on file prior to visit. Current Outpatient Medications   Medication Sig Dispense Refill    Vitamin Mixture (VITAMIN E COMPLETE PO) Take by mouth      tiZANidine (ZANAFLEX) 4 MG tablet Take 1 tablet by mouth nightly 90 tablet 0    metoprolol (LOPRESSOR) 100 MG tablet Take 1 tablet by mouth 2 times daily 180 tablet 0    clopidogrel (PLAVIX) 75 MG tablet Take 1 tablet by mouth daily 90 tablet 0    apixaban (ELIQUIS) 2.5 MG TABS tablet Take 1 tablet by mouth 2 times daily 180 tablet 0    levothyroxine (SYNTHROID) 50 MCG tablet Take 1 tablet by mouth daily 90 tablet 0    pantoprazole (PROTONIX) 40 MG tablet Take 1 tablet by mouth daily 90 tablet 0    spironolactone (ALDACTONE) 25 MG tablet Take 1 tablet by mouth daily 90 tablet 0    gabapentin (NEURONTIN) 300 MG capsule Take 1 capsule by mouth 2 times daily for 90 days.  Intended supply: 30 days 180 capsule 0    albuterol sulfate HFA (PROVENTIL HFA) 108 (90 Base) MCG/ACT inhaler Inhale 2 puffs into the lungs every 6 hours as needed for Wheezing (Patient not taking: Reported on 8/22/2022) 3 Inhaler 1    Calcium Polycarbophil (FIBER) 625 MG TABS Take 1 tablet by mouth 2 times daily 60 tablet 5    Calcium Carbonate-Vitamin D (CALTRATE 600+D PO) Take 1 tablet by mouth 2 times daily.  Multiple Vitamin (MULTIVITAMIN PO) Take  by mouth daily. No current facility-administered medications for this visit. Vital Signs (Current)   There were no vitals filed for this visit. Vital Signs Statistics (for past 48 hrs)     No data recorded    BP Readings from Last 3 Encounters:   08/16/22 (!) 142/65   08/12/22 118/76   08/12/22 118/76     BMI  There is no height or weight on file to calculate BMI. Estimated body mass index is 23.83 kg/m² as calculated from the following:    Height as of 8/22/22: 5' (1.524 m). Weight as of 8/22/22: 122 lb (55.3 kg).     CBC   Lab Results   Component Value Date/Time    WBC 5.7 04/06/2022 09:30 AM    RBC 4.64 04/06/2022 09:30 AM    HGB 13.5 04/06/2022 09:30 AM    HCT 40.2 04/06/2022 09:30 AM    MCV 86.5 04/06/2022 09:30 AM    RDW 14.9 04/06/2022 09:30 AM     04/06/2022 09:30 AM     CMP    Lab Results   Component Value Date/Time     04/06/2022 09:30 AM    K 5.0 04/06/2022 09:30 AM     04/06/2022 09:30 AM    CO2 24 04/06/2022 09:30 AM    BUN 20 04/06/2022 09:30 AM    CREATININE 1.7 04/06/2022 09:30 AM    GFRAA 34 04/06/2022 09:30 AM    GFRAA >60 08/10/2011 09:55 AM    AGRATIO 1.2 04/12/2017 12:00 PM    LABGLOM 28 04/06/2022 09:30 AM    LABGLOM 44 11/17/2016 12:00 PM    LABGLOM 44 11/17/2016 12:00 PM    GLUCOSE 113 04/06/2022 09:30 AM    GLUCOSE 114 02/18/2020 12:21 PM    PROT 6.7 04/12/2017 12:00 PM    PROT 7.3 08/10/2011 09:55 AM    CALCIUM 9.2 04/06/2022 09:30 AM    BILITOT 0.3 04/12/2017 12:00 PM    ALKPHOS 69 04/12/2017 12:00 PM    AST 13 04/12/2017 12:00 PM    ALT 12 04/12/2017 12:00 PM     BMP    Lab Results   Component Value Date/Time     04/06/2022 09:30 AM    K 5.0 04/06/2022 09:30 AM     04/06/2022 09:30 AM    CO2 24 04/06/2022 09:30 AM    BUN 20 04/06/2022 09:30 AM    CREATININE 1.7 04/06/2022 09:30 AM    CALCIUM 9.2 04/06/2022 09:30 AM    GFRAA 34 04/06/2022 09:30 AM    GFRAA >60 08/10/2011 09:55 AM    LABGLOM 28 04/06/2022 09:30 AM    LABGLOM 44 11/17/2016 12:00 PM    LABGLOM 44 11/17/2016 12:00 PM    GLUCOSE 113 04/06/2022 09:30 AM    GLUCOSE 114 02/18/2020 12:21 PM     POCGlucose  No results for input(s): GLUCOSE in the last 72 hours.    Coags    Lab Results   Component Value Date/Time    PROTIME 11.3 04/02/2018 04:30 PM    INR 1.00 04/02/2018 04:30 PM    APTT 33.0 04/02/2018 04:30 PM     HCG (If Applicable) No results found for: PREGTESTUR, PREGSERUM, HCG, HCGQUANT   ABGs No results found for: PHART, PO2ART, HKT3MTF, EKQ6XSQ, BEART, W3GWAMRQ   Type & Screen (If Applicable)  No results found for: LABABO, LABRH                         BMI: Wt Readings from Last 3 Encounters:       NPO Status:                          Anesthesia Evaluation  Patient summary reviewed and Nursing notes reviewed no history of anesthetic complications:   Airway: Mallampati: II  TM distance: >3 FB   Neck ROM: full     Dental:    (+) partials      Pulmonary:normal exam    (+) COPD:                             Cardiovascular:  Exercise tolerance: poor (<4 METS),   (+) hypertension: moderate, dysrhythmias: atrial fibrillation,         Rhythm: irregular  Rate: normal           Beta Blocker:  Dose within 24 Hrs         Neuro/Psych:   (+) neuromuscular disease:, TIA (no residual sx per pt),              ROS comment: Cervical radiculopathy GI/Hepatic/Renal:   (+) GERD:, renal disease: CRI and dialysis,           Endo/Other:    (+) DiabetesType II DM, , hypothyroidism, blood dyscrasia (plavix, eliquis last dose last Monday): anticoagulation therapy:., .                 Abdominal: Vascular: negative vascular ROS. Other Findings:             Anesthesia Plan      general     ASA 3       Induction: intravenous and rapid sequence. MIPS: Postoperative opioids intended, Prophylactic antiemetics administered and Postoperative trial extubation. Anesthetic plan and risks discussed with patient and child/children. Plan discussed with CRNA.           Post-op pain plan if not by surgeon: single peripheral nerve block            This pre-anesthesia assessment may be used as a history and physical.      Jose Antonio Lazaro MD  August 26, 2022  7:57 AM

## 2022-08-26 NOTE — PROGRESS NOTES
Family to bedside, patient up to bathroom. Continues to deny pain. Surgical incision remains intact. Pharmacy delivered prescription to bedside.

## 2022-08-26 NOTE — DISCHARGE INSTRUCTIONS
Valdemar Hull M.D.    [phone] [address] [facility] [facility] [address], [address]    Excision discharge instructions    Resume regular diet  May drive tomorrow  May shower  No heavy lifting for 24 hours  May use pain meds and ice on the surgical site as needed  Call the office as needed for any post op questions and for follow up in two weeks    ANESTHESIA DISCHARGE INSTRUCTIONS    Wear your seatbelt home. You are under the influence of drugs-do not drink alcohol, drive, operate machinery, make any important decisions or sign any legal documents for 24 hours. Children should not ride bikes, [place] or play on gym sets for 24 hours after surgery. A responsible adult needs to be with you for 24 hours. You may experience lightheadedness, dizziness, or sleepiness following surgery. Rest at home today- increase activity as tolerated. Progress slowly to a regular diet unless your physician has instructed you otherwise. Drink plenty of water. If persistent nausea and vomiting becomes a problem, call your physician. Coughing, sore throat and muscle aches are other side effects of anesthesia, and should improve with time. Do not drive or operate machinery while taking narcotics. Females of childbearing potential and on hormonal birth control, should use two forms of contraception following procedure if given a medication called sugammadex and/or emend. Additional contraception should be used for 7 days for sugammadex and/or 28 days for emend. These medications have a potential to reduce the effectiveness of hormonal birth control.

## 2022-08-26 NOTE — H&P
[facility]      I have reviewed the history and physical and examined the patient and find no relevant changes. I have reviewed with the patient and/or family the risks, benefits, and alternatives to the procedure.     Yessica Gagnon MD  8/26/2022

## 2022-08-26 NOTE — PROGRESS NOTES
Dr. Carie Marques did lymph node biopsy today, metastatic cancer dx. He wants referral to Dr. Max Fitzpatrick done. Created referral and walked over to his office for them to call and schedule an appointment for her.

## 2022-08-27 NOTE — OP NOTE
[address]                     [address] [facility], [address] [facility]                                OPERATIVE REPORT    PATIENT NAME: Ceasar Carpenter                      :        1933  MED REC NO:   [id number]                          ROOM:  ACCOUNT NO:   [de-identified]                           ADMIT DATE: 2022  PROVIDER:     Missy Benton MD    DATE OF PROCEDURE:  2022    PREOPERATIVE DIAGNOSIS:  Cervical adenopathy. POSTOPERATIVE DIAGNOSES:  Right cervical deep adenopathy and metastatic  carcinoma. PROCEDURE PERFORMED:  Right deep cervical lymph node biopsy. SURGEON:  Missy Benton MD.    ANESTHESIA:  General plus local.    ESTIMATED BLOOD LOSS:  Minimal.    COMPLICATIONS:  None. SPECIMEN:  Cervical node sent for frozen and the initial frozen section  is positive for carcinoma. The 2 cm node was sent fresh and additional  pathology and final stains and studies were pending at this time. DETAILS OF SURGERY:  The patient is an 80-year female presenting for  lymph node biopsy today. She is met in the preop holding bay. All  questions were answered, and she consents to proceed. The patient was brought to the operating room and placed on the  operating table in supine position. General anesthetic was  administered, and the neck and chest area on the right side was all  prepped and draped sterilely. The arms and legs were padded and  protected carefully during the surgery. A skin incision was made overlying the right neck node, and dissection  was carried down through the skin, subcutaneous tissue, and platysma  level to the node. The node was elevated with a suture and then  dissected circumferentially carefully with the LigaSure small jaw  sealing the vessels and lymphatics around the node. This was taken down  the deep cervical tissue plane in the lower neck region on the right. The specimen was sent to Pathology. The wound was closed with  interrupted 3-0 Vicryl in subdermal plane, 4-0 Monocryl for skin and  Dermabond glue. The patient was taken to recovery room in stable  condition postop. We will make Oncology referral postoperatively for determination of  therapy for her metastatic carcinoma.         Patricio Smith MD    D: 08/26/2022 13:18:39       T: 08/26/2022 13:24:15     [id number]  Job#: [id number]     Doc#: [id number]    CC:  MD Jude Herndon MD

## 2022-09-16 ENCOUNTER — HOSPITAL ENCOUNTER (OUTPATIENT)
Dept: MRI IMAGING | Age: 87
Discharge: HOME | End: 2022-09-16
Payer: MEDICARE

## 2022-09-16 DIAGNOSIS — C50.111 MALIGNANT NEOPLASM OF CENTRAL PORTION OF RIGHT FEMALE BREAST, UNSPECIFIED ESTROGEN RECEPTOR STATUS (HCC): ICD-10-CM

## 2022-09-16 DIAGNOSIS — C50.919 MALIGNANT NEOPLASM OF FEMALE BREAST, UNSPECIFIED ESTROGEN RECEPTOR STATUS, UNSPECIFIED LATERALITY, UNSPECIFIED SITE OF BREAST (HCC): ICD-10-CM

## 2022-09-16 PROCEDURE — 70551 MRI BRAIN STEM W/O DYE: CPT

## 2022-09-19 ENCOUNTER — HOSPITAL ENCOUNTER (OUTPATIENT)
Dept: PET IMAGING | Age: 87
Discharge: HOME | End: 2022-09-19
Payer: MEDICARE

## 2022-09-19 DIAGNOSIS — C50.919 MALIGNANT NEOPLASM OF FEMALE BREAST, UNSPECIFIED ESTROGEN RECEPTOR STATUS, UNSPECIFIED LATERALITY, UNSPECIFIED SITE OF BREAST (HCC): ICD-10-CM

## 2022-09-19 PROCEDURE — 78815 PET IMAGE W/CT SKULL-THIGH: CPT

## 2022-09-19 PROCEDURE — 3430000000 HC RX DIAGNOSTIC RADIOPHARMACEUTICAL: Performed by: INTERNAL MEDICINE

## 2022-09-19 PROCEDURE — A9552 F18 FDG: HCPCS | Performed by: INTERNAL MEDICINE

## 2022-09-19 RX ORDER — FLUDEOXYGLUCOSE F 18 200 MCI/ML
12.81 INJECTION, SOLUTION INTRAVENOUS
Status: COMPLETED | OUTPATIENT
Start: 2022-09-19 | End: 2022-09-19

## 2022-09-19 RX ADMIN — FLUDEOXYGLUCOSE F 18 12.81 MILLICURIE: 200 INJECTION, SOLUTION INTRAVENOUS at 15:28

## 2022-09-22 ENCOUNTER — HOSPITAL ENCOUNTER (OUTPATIENT)
Dept: MRI IMAGING | Age: 87
Discharge: HOME | End: 2022-09-22
Payer: MEDICARE

## 2022-09-22 DIAGNOSIS — Z12.89 ENCOUNTER FOR SCREENING FOR MALIGNANT NEOPLASM OF OTHER SITES: ICD-10-CM

## 2022-09-22 PROCEDURE — 6360000004 HC RX CONTRAST MEDICATION: Performed by: RADIOLOGY

## 2022-09-22 PROCEDURE — A9576 INJ PROHANCE MULTIPACK: HCPCS | Performed by: RADIOLOGY

## 2022-09-22 PROCEDURE — 70553 MRI BRAIN STEM W/O & W/DYE: CPT

## 2022-09-22 RX ADMIN — GADOTERIDOL 12 ML: 279.3 INJECTION, SOLUTION INTRAVENOUS at 12:12

## 2022-10-24 ENCOUNTER — HOSPITAL ENCOUNTER (OUTPATIENT)
Dept: GENERAL RADIOLOGY | Age: 87
Discharge: HOME | End: 2022-10-24
Payer: MEDICARE

## 2022-10-24 ENCOUNTER — HOSPITAL ENCOUNTER (OUTPATIENT)
Age: 87
Discharge: HOME | End: 2022-10-24
Payer: MEDICARE

## 2022-10-24 DIAGNOSIS — C50.919 MALIGNANT NEOPLASM OF FEMALE BREAST, UNSPECIFIED ESTROGEN RECEPTOR STATUS, UNSPECIFIED LATERALITY, UNSPECIFIED SITE OF BREAST (HCC): ICD-10-CM

## 2022-10-24 PROCEDURE — 71046 X-RAY EXAM CHEST 2 VIEWS: CPT

## (undated) DEVICE — HYPODERMIC SAFETY NEEDLE: Brand: MAGELLAN

## (undated) DEVICE — BLADE ES ELASTOMERIC COAT INSUL DURABLE BEND UPTO 90DEG

## (undated) DEVICE — CONTAINER,SPECIMEN,OR STERILE,4OZ: Brand: MEDLINE

## (undated) DEVICE — ELECTRODE PT RET AD L9FT HI MOIST COND ADH HYDRGEL CORDED

## (undated) DEVICE — SOLUTION IRRIG 500ML 0.9% SOD CHL USP POUR PLAS BTL

## (undated) DEVICE — MEDICINE CUP, GRADUATED, STER: Brand: MEDLINE

## (undated) DEVICE — GOWN,REINFORCED,POLY,SIRUS,XLNG/XXLG: Brand: MEDLINE

## (undated) DEVICE — STERILE LATEX POWDER-FREE SURGICAL GLOVESWITH NITRILE COATING: Brand: PROTEXIS

## (undated) DEVICE — SYRINGE, LUER LOCK, 10ML: Brand: MEDLINE

## (undated) DEVICE — DRAPE EENT SPLIT

## (undated) DEVICE — NEEDLE HYPO 30GA L1IN BGE POLYPR HUB S STL REG BVL STR W/O

## (undated) DEVICE — GLOVE SURG SZ 85 L12IN FNGR ORTHO 126MIL CRM LTX FREE

## (undated) DEVICE — GOWN SIRUS NONREIN LG W/TWL: Brand: MEDLINE INDUSTRIES, INC.

## (undated) DEVICE — ROOM TURNOVER KIT W/ ARM STRP

## (undated) DEVICE — SUTURE CHROMIC GUT SZ 4-0 L18IN ABSRB BRN L13MM P-3 3/8 CIR 1654G

## (undated) DEVICE — PACK PROCEDURE SURG EXTREMITY MFFOP CUST

## (undated) DEVICE — NEEDLE HYPO 27GA L1.25IN GRY POLYPR HUB S STL REG BVL STR

## (undated) DEVICE — GLOVE ORANGE PI 8 1/2   MSG9085

## (undated) DEVICE — SUTURE VCRL SZ 4-0 L27IN ABSRB UD L19MM PS-2 3/8 CIR PRIM J426H

## (undated) DEVICE — TOWEL,OR,DSP,ST,BLUE,STD,4/PK,20PK/CS: Brand: MEDLINE

## (undated) DEVICE — CORD,CAUTERY,BIPOLAR,STERILE: Brand: MEDLINE

## (undated) DEVICE — APPLICATOR MEDICATED 26 CC SOLUTION HI LT ORNG CHLORAPREP

## (undated) DEVICE — SOLUTION IRRIG 1000ML 0.9% SOD CHL USP POUR PLAS BTL

## (undated) DEVICE — ADHESIVE SKIN CLSR 0.7ML TOP DERMBND ADV

## (undated) DEVICE — SYRINGE MED 10ML TRNSLUC BRL PLUNG BLK MRK POLYPR CTRL

## (undated) DEVICE — SUTURE VCRL + SZ 3-0 L18IN ABSRB UD SH 1/2 CIR TAPERCUT NDL VCP864D

## (undated) DEVICE — INTENDED FOR TISSUE SEPARATION, AND OTHER PROCEDURES THAT REQUIRE A SHARP SURGICAL BLADE TO PUNCTURE OR CUT.: Brand: BARD-PARKER ® STAINLESS STEEL BLADES

## (undated) DEVICE — SUTURE MCRYL + SZ 4-0 L27IN ABSRB UD L19MM PS-2 3/8 CIR MCP426H

## (undated) DEVICE — DRAPE ADOLESCENT  LAPAROTOMY

## (undated) DEVICE — SHEET,DRAPE,40X58,STERILE: Brand: MEDLINE

## (undated) DEVICE — PENCIL SMK EVAC TELSCP 3 M TBNG

## (undated) DEVICE — SEALER LAP SM L18.8CM OPN JAW HAND/FOOT SWCH FORCETRIAD

## (undated) DEVICE — YANKAUER,BULB TIP,W/O VENT,RIGID,STERILE: Brand: MEDLINE

## (undated) DEVICE — CONTROL SYRINGE LUER-LOCK TIP: Brand: MONOJECT

## (undated) DEVICE — SUTURE CHROMIC GUT SZ 5-0 L18IN ABSRB BRN PS-4 L16MM 1/2 1642G

## (undated) DEVICE — COVER LT HNDL BLU PLAS

## (undated) DEVICE — GLOVE SURG SZ 6.5 L11.2IN FNGR THK9.8MIL STRW LTX POLYMER

## (undated) DEVICE — GAUZE,SPONGE,4"X4",8PLY,STRL,LF,10/TRAY: Brand: MEDLINE

## (undated) DEVICE — SOLUTION IV IRRIG 500ML 0.9% SODIUM CHL 2F7123

## (undated) DEVICE — MERCY FAIRFIELD TURNOVER KIT: Brand: MEDLINE INDUSTRIES, INC.

## (undated) DEVICE — YANKAUER SUCTION INSTRUMENT NO CONTROL VENT, BULB TIP, CLEAR: Brand: YANKAUER

## (undated) DEVICE — SPONGE,TONSIL,DBL STRNG,XRAY,SM,7/8",ST: Brand: MEDLINE INDUSTRIES, INC.

## (undated) DEVICE — BANDAGE GZ W2XL75IN ST RAYON POLY CNFRM STRTCH LTWT